# Patient Record
Sex: FEMALE | Race: WHITE | NOT HISPANIC OR LATINO | Employment: FULL TIME | URBAN - METROPOLITAN AREA
[De-identification: names, ages, dates, MRNs, and addresses within clinical notes are randomized per-mention and may not be internally consistent; named-entity substitution may affect disease eponyms.]

---

## 2020-07-19 ENCOUNTER — LAB VISIT (OUTPATIENT)
Dept: PRIMARY CARE CLINIC | Facility: CLINIC | Age: 40
End: 2020-07-19

## 2020-07-19 DIAGNOSIS — Z00.6 RESEARCH STUDY PATIENT: Primary | ICD-10-CM

## 2020-07-19 DIAGNOSIS — Z00.6 RESEARCH STUDY PATIENT: ICD-10-CM

## 2020-07-19 DIAGNOSIS — Z01.84 ENCOUNTER FOR ANTIBODY RESPONSE EXAMINATION: ICD-10-CM

## 2020-07-19 LAB — SARS-COV-2 IGG SERPLBLD QL IA.RAPID: NEGATIVE

## 2020-07-19 PROCEDURE — 86769 SARS-COV-2 COVID-19 ANTIBODY: CPT

## 2020-07-19 PROCEDURE — U0003 INFECTIOUS AGENT DETECTION BY NUCLEIC ACID (DNA OR RNA); SEVERE ACUTE RESPIRATORY SYNDROME CORONAVIRUS 2 (SARS-COV-2) (CORONAVIRUS DISEASE [COVID-19]), AMPLIFIED PROBE TECHNIQUE, MAKING USE OF HIGH THROUGHPUT TECHNOLOGIES AS DESCRIBED BY CMS-2020-01-R: HCPCS

## 2020-07-19 NOTE — RESEARCH
Date of Consent: 7/19/2020    Sponsor: [facility]    Study Title/IRB Number: Observational study of Sars-CoV2 Immunoglobulin G (IgG) seroprevalence among the [facility] population over time 2020.163  Principle Investigator: Heather Vargas, PhD    Did the patient need translation services? No   name: N/A    Prior to the Informed Consent (IC) being signed, or any study protocol required data collection, testing, procedure, or intervention being performed, the following was done and/or discussed:   Patient was given a paper copy of the IC for review    Patient was given study FAQ   Purpose of the study and qualifications to participate    Study design and tests or procedures done at this visit   Confidentiality and HIPAA Authorization for Release of Medical Records for the research trial/ subject's rights/research related injury   Risk, Benefits, Alternative Treatments, Compensation and Costs   Participation in the research trial is voluntary and patient may withdraw at anytime   Contact information for study related questions    Patient verbalizes understanding of the above: Yes  Contact information for PI and IRB given to patient: Yes  Patient able to adequately summarize: the purpose of the study, the risks associated with the study, and all procedures, testing, and follow-ups associated with the study: Yes    The consent was discussed verbally with the patient and all questions were answered satisfactorily. Patient gave verbal consent for the Seroprevalence research study with an IRB approval date of 06/23/2020.      The Consent, Consent Witness and name of Clinical Research Coordinator consenting was captured and documented in REDCap.    All Inclusion and Exclusion Criteria reviewed, subject meets all Inclusion criteria and does not meet any Exclusion Criteria at this time.     Patient Eligibility was confirmed.    Patient responded to survey questions.    The following biospecimen  collection procedures were collected:    -Nasopharyngeal Swab Collection  -Blood collection

## 2020-07-21 LAB — SARS-COV-2 RNA RESP QL NAA+PROBE: NOT DETECTED

## 2024-06-09 ENCOUNTER — HOSPITAL ENCOUNTER (INPATIENT)
Facility: HOSPITAL | Age: 44
LOS: 4 days | Discharge: HOME | DRG: 885 | End: 2024-06-13
Attending: PSYCHIATRY & NEUROLOGY | Admitting: STUDENT IN AN ORGANIZED HEALTH CARE EDUCATION/TRAINING PROGRAM
Payer: COMMERCIAL

## 2024-06-09 ENCOUNTER — HOSPITAL ENCOUNTER (EMERGENCY)
Facility: HOSPITAL | Age: 44
Discharge: PSYCHIATRIC OR SUBSTANCE USE FACILITY | End: 2024-06-09
Attending: EMERGENCY MEDICINE
Payer: COMMERCIAL

## 2024-06-09 VITALS
WEIGHT: 280 LBS | DIASTOLIC BLOOD PRESSURE: 68 MMHG | RESPIRATION RATE: 18 BRPM | OXYGEN SATURATION: 97 % | TEMPERATURE: 96 F | BODY MASS INDEX: 51.21 KG/M2 | HEART RATE: 68 BPM | SYSTOLIC BLOOD PRESSURE: 135 MMHG

## 2024-06-09 DIAGNOSIS — F29 PSYCHOSIS, UNSPECIFIED PSYCHOSIS TYPE: Primary | ICD-10-CM

## 2024-06-09 DIAGNOSIS — Z00.8 MEDICAL CLEARANCE FOR PSYCHIATRIC ADMISSION: ICD-10-CM

## 2024-06-09 DIAGNOSIS — F12.10 MARIJUANA ABUSE: ICD-10-CM

## 2024-06-09 DIAGNOSIS — F23 PSYCHOTIC EPISODE: Primary | ICD-10-CM

## 2024-06-09 DIAGNOSIS — F29 UNSPECIFIED PSYCHOSIS NOT DUE TO A SUBSTANCE OR KNOWN PHYSIOLOGICAL CONDITION: ICD-10-CM

## 2024-06-09 LAB
ALBUMIN SERPL BCP-MCNC: 4.4 G/DL (ref 3.5–5.2)
ALP SERPL-CCNC: 95 U/L (ref 55–135)
ALT SERPL W/O P-5'-P-CCNC: 26 U/L (ref 10–44)
AMPHET+METHAMPHET UR QL: NEGATIVE
ANION GAP SERPL CALC-SCNC: 13 MMOL/L (ref 8–16)
APAP SERPL-MCNC: <3 UG/ML (ref 10–20)
AST SERPL-CCNC: 30 U/L (ref 10–40)
B-HCG UR QL: NEGATIVE
BARBITURATES UR QL SCN>200 NG/ML: NEGATIVE
BASOPHILS # BLD AUTO: 0.06 K/UL (ref 0–0.2)
BASOPHILS NFR BLD: 0.6 % (ref 0–1.9)
BENZODIAZ UR QL SCN>200 NG/ML: NEGATIVE
BILIRUB SERPL-MCNC: 0.5 MG/DL (ref 0.1–1)
BILIRUB UR QL STRIP: NEGATIVE
BUN SERPL-MCNC: 7 MG/DL (ref 6–20)
BZE UR QL SCN: NEGATIVE
CALCIUM SERPL-MCNC: 9.8 MG/DL (ref 8.7–10.5)
CANNABINOIDS UR QL SCN: ABNORMAL
CHLORIDE SERPL-SCNC: 105 MMOL/L (ref 95–110)
CLARITY UR: CLEAR
CO2 SERPL-SCNC: 22 MMOL/L (ref 23–29)
COLOR UR: COLORLESS
CREAT SERPL-MCNC: 0.8 MG/DL (ref 0.5–1.4)
CREAT UR-MCNC: 13.6 MG/DL (ref 15–325)
DIFFERENTIAL METHOD BLD: ABNORMAL
EOSINOPHIL # BLD AUTO: 0.1 K/UL (ref 0–0.5)
EOSINOPHIL NFR BLD: 1 % (ref 0–8)
ERYTHROCYTE [DISTWIDTH] IN BLOOD BY AUTOMATED COUNT: 11.9 % (ref 11.5–14.5)
EST. GFR  (NO RACE VARIABLE): >60 ML/MIN/1.73 M^2
ETHANOL SERPL-MCNC: <10 MG/DL
GLUCOSE SERPL-MCNC: 106 MG/DL (ref 70–110)
GLUCOSE UR QL STRIP: NEGATIVE
HCT VFR BLD AUTO: 42.4 % (ref 37–48.5)
HCV AB SERPL QL IA: NEGATIVE
HEP C VIRUS HOLD SPECIMEN: NORMAL
HGB BLD-MCNC: 14.4 G/DL (ref 12–16)
HGB UR QL STRIP: NEGATIVE
HIV 1+2 AB+HIV1 P24 AG SERPL QL IA: NEGATIVE
IMM GRANULOCYTES # BLD AUTO: 0.06 K/UL (ref 0–0.04)
IMM GRANULOCYTES NFR BLD AUTO: 0.6 % (ref 0–0.5)
KETONES UR QL STRIP: NEGATIVE
LEUKOCYTE ESTERASE UR QL STRIP: NEGATIVE
LYMPHOCYTES # BLD AUTO: 3 K/UL (ref 1–4.8)
LYMPHOCYTES NFR BLD: 29.9 % (ref 18–48)
MCH RBC QN AUTO: 30.6 PG (ref 27–31)
MCHC RBC AUTO-ENTMCNC: 34 G/DL (ref 32–36)
MCV RBC AUTO: 90 FL (ref 82–98)
METHADONE UR QL SCN>300 NG/ML: NEGATIVE
MONOCYTES # BLD AUTO: 0.8 K/UL (ref 0.3–1)
MONOCYTES NFR BLD: 7.5 % (ref 4–15)
NEUTROPHILS # BLD AUTO: 6.2 K/UL (ref 1.8–7.7)
NEUTROPHILS NFR BLD: 60.4 % (ref 38–73)
NITRITE UR QL STRIP: NEGATIVE
NRBC BLD-RTO: 0 /100 WBC
OPIATES UR QL SCN: NEGATIVE
PCP UR QL SCN>25 NG/ML: NEGATIVE
PH UR STRIP: 8 [PH] (ref 5–8)
PLATELET # BLD AUTO: 339 K/UL (ref 150–450)
PMV BLD AUTO: 10.1 FL (ref 9.2–12.9)
POTASSIUM SERPL-SCNC: 3.9 MMOL/L (ref 3.5–5.1)
PROT SERPL-MCNC: 8 G/DL (ref 6–8.4)
PROT UR QL STRIP: NEGATIVE
RBC # BLD AUTO: 4.7 M/UL (ref 4–5.4)
SARS-COV-2 RDRP RESP QL NAA+PROBE: NEGATIVE
SODIUM SERPL-SCNC: 140 MMOL/L (ref 136–145)
SP GR UR STRIP: <1.005 (ref 1–1.03)
TOXICOLOGY INFORMATION: ABNORMAL
TSH SERPL DL<=0.005 MIU/L-ACNC: 1.56 UIU/ML (ref 0.4–4)
URN SPEC COLLECT METH UR: ABNORMAL
UROBILINOGEN UR STRIP-ACNC: NEGATIVE EU/DL
WBC # BLD AUTO: 10.17 K/UL (ref 3.9–12.7)

## 2024-06-09 PROCEDURE — 11400000 HC PSYCH PRIVATE ROOM

## 2024-06-09 PROCEDURE — 81025 URINE PREGNANCY TEST: CPT | Performed by: EMERGENCY MEDICINE

## 2024-06-09 PROCEDURE — 82077 ASSAY SPEC XCP UR&BREATH IA: CPT | Performed by: EMERGENCY MEDICINE

## 2024-06-09 PROCEDURE — 80053 COMPREHEN METABOLIC PANEL: CPT | Performed by: EMERGENCY MEDICINE

## 2024-06-09 PROCEDURE — G0425 INPT/ED TELECONSULT30: HCPCS | Mod: GT,,, | Performed by: PSYCHIATRY & NEUROLOGY

## 2024-06-09 PROCEDURE — U0002 COVID-19 LAB TEST NON-CDC: HCPCS | Performed by: EMERGENCY MEDICINE

## 2024-06-09 PROCEDURE — 25000003 PHARM REV CODE 250: Performed by: EMERGENCY MEDICINE

## 2024-06-09 PROCEDURE — 84443 ASSAY THYROID STIM HORMONE: CPT | Performed by: EMERGENCY MEDICINE

## 2024-06-09 PROCEDURE — 81003 URINALYSIS AUTO W/O SCOPE: CPT | Mod: 59 | Performed by: EMERGENCY MEDICINE

## 2024-06-09 PROCEDURE — 80307 DRUG TEST PRSMV CHEM ANLYZR: CPT | Performed by: EMERGENCY MEDICINE

## 2024-06-09 PROCEDURE — 85025 COMPLETE CBC W/AUTO DIFF WBC: CPT | Performed by: EMERGENCY MEDICINE

## 2024-06-09 PROCEDURE — 99285 EMERGENCY DEPT VISIT HI MDM: CPT

## 2024-06-09 PROCEDURE — 87389 HIV-1 AG W/HIV-1&-2 AB AG IA: CPT | Performed by: EMERGENCY MEDICINE

## 2024-06-09 PROCEDURE — 86803 HEPATITIS C AB TEST: CPT | Performed by: EMERGENCY MEDICINE

## 2024-06-09 PROCEDURE — 80143 DRUG ASSAY ACETAMINOPHEN: CPT | Performed by: EMERGENCY MEDICINE

## 2024-06-09 RX ORDER — LORAZEPAM 1 MG/1
1 TABLET ORAL
Status: COMPLETED | OUTPATIENT
Start: 2024-06-09 | End: 2024-06-09

## 2024-06-09 RX ORDER — HYDROXYZINE PAMOATE 25 MG/1
25 CAPSULE ORAL EVERY 6 HOURS PRN
Status: DISCONTINUED | OUTPATIENT
Start: 2024-06-10 | End: 2024-06-09

## 2024-06-09 RX ORDER — RISPERIDONE 1 MG/1
2 TABLET ORAL
Status: COMPLETED | OUTPATIENT
Start: 2024-06-09 | End: 2024-06-09

## 2024-06-09 RX ORDER — TALC
6 POWDER (GRAM) TOPICAL NIGHTLY PRN
Status: DISCONTINUED | OUTPATIENT
Start: 2024-06-10 | End: 2024-06-09

## 2024-06-09 RX ORDER — HYDROXYZINE PAMOATE 50 MG/1
50 CAPSULE ORAL EVERY 6 HOURS PRN
Status: DISCONTINUED | OUTPATIENT
Start: 2024-06-10 | End: 2024-06-10

## 2024-06-09 RX ORDER — TALC
6 POWDER (GRAM) TOPICAL NIGHTLY PRN
Status: DISCONTINUED | OUTPATIENT
Start: 2024-06-10 | End: 2024-06-10

## 2024-06-09 RX ADMIN — HYDROXYZINE PAMOATE 50 MG: 50 CAPSULE ORAL at 11:06

## 2024-06-09 RX ADMIN — Medication 6 MG: at 11:06

## 2024-06-09 RX ADMIN — LORAZEPAM 1 MG: 1 TABLET ORAL at 02:06

## 2024-06-09 RX ADMIN — RISPERIDONE 2 MG: 1 TABLET, FILM COATED ORAL at 02:06

## 2024-06-09 NOTE — ED NOTES
Pt belongings Grey sweat pants, grey shirt , white t shirt, dark grey t shirt. 2 pair multi color leggins, blue  pull over, inside a blue nike duffel bag. Locker #28. //kah

## 2024-06-09 NOTE — ED NOTES
Pt belongings searched and placed in locked cabinet 28:  Light purple shirt  Purple bra  Black panties  Blue pajama pants  Blue sandals  Silver ring

## 2024-06-09 NOTE — CONSULTS
"[facility]  Psychiatry  Telepsychiatry Consult Note    Please see previous notes:    Patient agreeable to consultation via telepsychiatry.    Tele-Consultation from Psychiatry started: 6/9/2024 at 1256 pm  The chief complaint leading to psychiatric consultation is: jameson  This consultation was requested by Dr. Yonathan Cornell, the Emergency Department attending physician.  The location of the consulting psychiatrist is  [facility] .  The patient location is  [facility] EMERGENCY DEPARTMENT   The patient arrived at the ED at: 8 am    Also present with the patient at the time of the consultation: nurse    Patient Identification:   Jennifer Caceres is a 44 y.o. female.    Patient information was obtained from patient and past medical records.  Patient presented involuntarily on transportation hold to the Emergency Department      Altered Mental Status        Per EMS, pt expressing manic behavior. Received 1.25mg Versed en route, she is calm and cooperative at this time.  reports patient has been taking Amoxicillin x8 days for strep throat and believes pt may be having a reaction to the abx.  reports similar episode x3 years ago.           Review of patient's allergies indicates:   Allergen Reactions    Tirzepatide (weight loss) Anxiety, Hives, Itching and Swelling          History of Present Illness      HPI     6/9/2024, 10:51 AM  History obtained from the patient and EMS                  History of Present Illness: Jennifer Caceres is a 44 y.o. female patient with a PMHx of anxiety disorder who presents to the Emergency Department for evaluation of AMS which onset PTA. Pt believes she is in [place] and doesn't know why she is here. Pt states she has been awake in an "in and out" state for 5 days. Pt states she is having auditory hallucinations but has always had them. Pt denies any SI/HI. Per EMS,  reports pt has taken Amoxicillin 8 days for strep throat and believes this is a " reaction of the medication because she had a similar episode 3 years ago. Pt is in calm and cooperative manner. Per EMS, prior Tx includes 1.25 mg Versed en route.     Arrival mode: AASI       Inpatient consult to Telemedicine - Psyc  Consult performed by: Jenna Nieto MD  Consult ordered by: Yonathan Cornell Jr., MD  Reason for consult: jameson        Teleconsult Time Documentation  Subjective:     History of Present Illness:  The patient received 1.5 mg of Versed on route to the ER this morning.  She has interviewed in the emergency room.  She is acutely manic.  She is experiencing some flight of ideas and repetition.  Affect is labile with irritability to tearfulness.  She is experiencing herself as pressing her own thoughts into other people's minds and hearing the thoughts of everybody else in the hospital positive auditory hallucinations as well.  She is having suicidal thoughts and she feels like everyone wants me to die and join them.  She states that she does not have any intention to try and harm herself while she has in the hospital.  She is agreeable to getting some medication to try and help her feel better.  She does not want me to talk to her  who is waiting in the waiting room.  She can not really explain why.  I could not really get a cogent history from her beyond this because of her mental status but this has happened previously proximally 3 years ago apparently.    Psychiatric History:   Reportedly 1 prior manic episode 3 years ago unknown if she is still taking medications    Substance Abuse History:  Unable to obtain    Legal History: Past charges/incarcerations:  Unable to obtain     Family Psychiatric History:  Unable to obtain      Social History:    Unable to obtain other than that she is  she came in with her     Psychiatric Mental Status Exam:  Arousal: alert  Sensorium/Orientation: oriented to person, situation  Behavior/Cooperation: reluctant to participate,  psychomotor agitation, restless and fidgety , eye contact minimal   Speech: loud, pressured, spontaneous, rapid  Language: grossly intact  Mood: States that she feels like everyone wants her to be dead   Affect:  Labile and inappropriate distressed  Thought Process: flight of ideas  Thought Content:   Auditory hallucinations: YES:       Visual hallucinations: NO  Paranoia: YES:       Delusions:  YES:       Suicidal ideation: YES:       Homicidal ideation: NO  Attention/Concentration:  Impaired  Memory:  Deferred  Fund of Knowledge:  Deferred  Abstract reasoning:  Deferred  Insight: poor awareness of illness  Judgment: limited      Past Medical History:   Past Medical History:   Diagnosis Date    Anxiety disorder, unspecified     History of abnormal cervical Pap smear       Laboratory Data:   Labs Reviewed   CBC W/ AUTO DIFFERENTIAL - Abnormal; Notable for the following components:       Result Value    Immature Granulocytes 0.6 (*)     Immature Grans (Abs) 0.06 (*)     All other components within normal limits   COMPREHENSIVE METABOLIC PANEL - Abnormal; Notable for the following components:    CO2 22 (*)     All other components within normal limits   ACETAMINOPHEN LEVEL - Abnormal; Notable for the following components:    Acetaminophen (Tylenol), Serum <3.0 (*)     All other components within normal limits   ALCOHOL,MEDICAL (ETHANOL)   SARS-COV-2 RNA AMPLIFICATION, QUAL   HIV 1 / 2 ANTIBODY    Narrative:     Release to patient->Immediate   HEPATITIS C ANTIBODY    Narrative:     Release to patient->Immediate   HEP C VIRUS HOLD SPECIMEN    Narrative:     Release to patient->Immediate   TSH   URINALYSIS, REFLEX TO URINE CULTURE   DRUG SCREEN PANEL, URINE EMERGENCY   PREGNANCY TEST, URINE RAPID      Allergies:    Review of patient's allergies indicates:   Allergen Reactions    Tirzepatide (weight loss) Anxiety, Hives, Itching and Swelling       Medications in ER: Medications - No data to display    Medications at home:   Unknown    No new subjective & objective note has been filed under this hospital service since the last note was generated.      Assessment - Diagnosis - Goals:     Diagnosis/Impression:  Bipolar 1 with psychotic features manic severe with psychosis currently.  Gravely disabled requiring inpatient psychiatric hospitalization for stabilization and treatment.    Rec:   Risperidone 2 mg and Ativan 1 mg now  Pec     Time with patient: 125 min      More than 50% of the time was spent counseling/coordinating care    Consulting clinician was informed of the encounter and consult note.    Consultation ended: 6/9/2024 at 118 pm    Jenna Nieto MD   Psychiatry  [facility]

## 2024-06-09 NOTE — ED PROVIDER NOTES
"SCRIBE #1 NOTE: I, Richie Rico, am scribing for, and in the presence of, Yonathan Cornell Jr., MD. I have scribed the entire note.       History     Chief Complaint   Patient presents with    Altered Mental Status     Per EMS, pt expressing manic behavior. Received 1.25mg Versed en route, she is calm and cooperative at this time.  reports patient has been taking Amoxicillin x8 days for strep throat and believes pt may be having a reaction to the abx.  reports similar episode x3 years ago.     Review of patient's allergies indicates:   Allergen Reactions    Tirzepatide (weight loss) Anxiety, Hives, Itching and Swelling         History of Present Illness     HPI    6/9/2024, 10:51 AM  History obtained from the patient and EMS      History of Present Illness: Jennifer Caceres is a 44 y.o. female patient with a PMHx of anxiety disorder who presents to the Emergency Department for evaluation of AMS which onset PTA. Pt believes she is in [place] and doesn't know why she is here. Pt states she has been awake in an "in and out" state for 5 days. Pt states she is having auditory hallucinations but has always had them. Pt denies any SI/HI. Per EMS,  reports pt has taken Amoxicillin 8 days for strep throat and believes this is a reaction of the medication because she had a similar episode 3 years ago. Pt is in calm and cooperative manner. Per EMS, prior Tx includes 1.25 mg Versed en route.    Arrival mode: AASI    PCP: No, Primary Doctor        Past Medical History:  Past Medical History:   Diagnosis Date    Anxiety disorder, unspecified     History of abnormal cervical Pap smear        Past Surgical History:  Past Surgical History:   Procedure Laterality Date    AUGMENTATION OF BREAST  2006    COLPOSCOPY           Family History:  Family History   Problem Relation Name Age of Onset    Emphysema Paternal Grandmother  65    Myocarditis Paternal Grandfather         Social History:  Social History "     Tobacco Use    Smoking status: Every Day     Current packs/day: 0.50     Types: Cigarettes    Smokeless tobacco: Never   Substance and Sexual Activity    Alcohol use: Yes     Comment: OCC    Drug use: Yes     Types: Marijuana     Comment: Medical Card    Sexual activity: Yes     Partners: Male        Review of Systems     Review of Systems   Constitutional:  Negative for fever.   HENT:  Negative for sore throat.    Respiratory:  Negative for shortness of breath.    Cardiovascular:  Negative for chest pain.   Gastrointestinal:  Negative for nausea.   Genitourinary:  Negative for dysuria.   Musculoskeletal:  Negative for back pain.   Skin:  Negative for rash.   Neurological:  Negative for weakness.   Hematological:  Does not bruise/bleed easily.   Psychiatric/Behavioral:  Positive for hallucinations (Auditory). Negative for suicidal ideas.         (-) HI   All other systems reviewed and are negative.       Physical Exam     Initial Vitals [06/09/24 0935]   BP Pulse Resp Temp SpO2   (!) 148/86 81 18 97.6 °F (36.4 °C) 95 %      MAP       --          Physical Exam  Nursing Notes and Vital Signs Reviewed.  Constitutional: Patient is in no acute distress. Well-developed and well-nourished.  Head: Atraumatic. Normocephalic.  Eyes:  EOM intact.  No scleral icterus.  ENT: Mucous membranes are moist.  Nares clear   Neck:  Full ROM. No JVD.  Cardiovascular: Regular rate. Regular rhythm No murmurs, rubs, or gallops. Distal pulses are 2+ and symmetric  Pulmonary/Chest: No respiratory distress. Clear to auscultation bilaterally. No wheezing or rales.  Equal chest wall rise bilaterally  Abdominal: Soft and non-distended.  There is no tenderness.  No rebound, guarding, or rigidity. Good bowel sounds.  Genitourinary: No CVA tenderness.  No suprapubic tenderness  Musculoskeletal: Moves all extremities. No obvious deformities.  5 x 5 strength in all extremities   Skin: Warm and dry.  Neurological:  Alert, awake, and appropriate.   Normal speech.  No acute focal neurological deficits are appreciated.  Two through 12 intact bilaterally.  Psychiatric:  patient was labile and pressured.  She notes continuous auditory hallucinations.  She states she speaks to got all day long.  Patient was not slept in 5 days.  She was jittery and agitated though calm.     ED Course   Procedures  ED Vital Signs:  Vitals:    06/09/24 0935 06/09/24 1146 06/09/24 1358   BP: (!) 148/86 (!) 142/82    Pulse: 81 72    Resp: 18 17    Temp: 97.6 °F (36.4 °C) 97.4 °F (36.3 °C)    TempSrc: Oral Oral    SpO2: 95% 97%    Weight:   127 kg (280 lb)       Abnormal Lab Results:  Labs Reviewed   CBC W/ AUTO DIFFERENTIAL - Abnormal; Notable for the following components:       Result Value    Immature Granulocytes 0.6 (*)     Immature Grans (Abs) 0.06 (*)     All other components within normal limits   COMPREHENSIVE METABOLIC PANEL - Abnormal; Notable for the following components:    CO2 22 (*)     All other components within normal limits   URINALYSIS, REFLEX TO URINE CULTURE - Abnormal; Notable for the following components:    Color, UA Colorless (*)     Specific Gravity, UA <1.005 (*)     All other components within normal limits    Narrative:     Specimen Source->Urine   DRUG SCREEN PANEL, URINE EMERGENCY - Abnormal; Notable for the following components:    THC Presumptive Positive (*)     Creatinine, Urine 13.6 (*)     All other components within normal limits    Narrative:     Specimen Source->Urine   ACETAMINOPHEN LEVEL - Abnormal; Notable for the following components:    Acetaminophen (Tylenol), Serum <3.0 (*)     All other components within normal limits   TSH   ALCOHOL,MEDICAL (ETHANOL)   SARS-COV-2 RNA AMPLIFICATION, QUAL   PREGNANCY TEST, URINE RAPID    Narrative:     Specimen Source->Urine   HIV 1 / 2 ANTIBODY    Narrative:     Release to patient->Immediate   HEPATITIS C ANTIBODY    Narrative:     Release to patient->Immediate   HEP C VIRUS HOLD SPECIMEN    Narrative:      Release to patient->Immediate        All Lab Results:  Results for orders placed or performed during the hospital encounter of 06/09/24   CBC auto differential   Result Value Ref Range    WBC 10.17 3.90 - 12.70 K/uL    RBC 4.70 4.00 - 5.40 M/uL    Hemoglobin 14.4 12.0 - 16.0 g/dL    Hematocrit 42.4 37.0 - 48.5 %    MCV 90 82 - 98 fL    MCH 30.6 27.0 - 31.0 pg    MCHC 34.0 32.0 - 36.0 g/dL    RDW 11.9 11.5 - 14.5 %    Platelets 339 150 - 450 K/uL    MPV 10.1 9.2 - 12.9 fL    Immature Granulocytes 0.6 (H) 0.0 - 0.5 %    Gran # (ANC) 6.2 1.8 - 7.7 K/uL    Immature Grans (Abs) 0.06 (H) 0.00 - 0.04 K/uL    Lymph # 3.0 1.0 - 4.8 K/uL    Mono # 0.8 0.3 - 1.0 K/uL    Eos # 0.1 0.0 - 0.5 K/uL    Baso # 0.06 0.00 - 0.20 K/uL    nRBC 0 0 /100 WBC    Gran % 60.4 38.0 - 73.0 %    Lymph % 29.9 18.0 - 48.0 %    Mono % 7.5 4.0 - 15.0 %    Eosinophil % 1.0 0.0 - 8.0 %    Basophil % 0.6 0.0 - 1.9 %    Differential Method Automated    Comprehensive metabolic panel   Result Value Ref Range    Sodium 140 136 - 145 mmol/L    Potassium 3.9 3.5 - 5.1 mmol/L    Chloride 105 95 - 110 mmol/L    CO2 22 (L) 23 - 29 mmol/L    Glucose 106 70 - 110 mg/dL    BUN 7 6 - 20 mg/dL    Creatinine 0.8 0.5 - 1.4 mg/dL    Calcium 9.8 8.7 - 10.5 mg/dL    Total Protein 8.0 6.0 - 8.4 g/dL    Albumin 4.4 3.5 - 5.2 g/dL    Total Bilirubin 0.5 0.1 - 1.0 mg/dL    Alkaline Phosphatase 95 55 - 135 U/L    AST 30 10 - 40 U/L    ALT 26 10 - 44 U/L    eGFR >60 >60 mL/min/1.73 m^2    Anion Gap 13 8 - 16 mmol/L   TSH   Result Value Ref Range    TSH 1.558 0.400 - 4.000 uIU/mL   Urinalysis, Reflex to Urine Culture Urine, Catheterized    Specimen: Urine   Result Value Ref Range    Specimen UA Urine, Catheterized     Color, UA Colorless (A) Yellow, Straw, Siobhan    Appearance, UA Clear Clear    pH, UA 8.0 5.0 - 8.0    Specific Gravity, UA <1.005 (A) 1.005 - 1.030    Protein, UA Negative Negative    Glucose, UA Negative Negative    Ketones, UA Negative Negative    Bilirubin  (UA) Negative Negative    Occult Blood UA Negative Negative    Nitrite, UA Negative Negative    Urobilinogen, UA Negative <2.0 EU/dL    Leukocytes, UA Negative Negative   Drug screen panel, emergency   Result Value Ref Range    Benzodiazepines Negative Negative    Methadone metabolites Negative Negative    Cocaine (Metab.) Negative Negative    Opiate Scrn, Ur Negative Negative    Barbiturate Screen, Ur Negative Negative    Amphetamine Screen, Ur Negative Negative    THC Presumptive Positive (A) Negative    Phencyclidine Negative Negative    Creatinine, Urine 13.6 (L) 15.0 - 325.0 mg/dL    Toxicology Information SEE COMMENT    Ethanol   Result Value Ref Range    Alcohol, Serum <10 <10 mg/dL   Acetaminophen level   Result Value Ref Range    Acetaminophen (Tylenol), Serum <3.0 (L) 10.0 - 20.0 ug/mL   COVID-19 Rapid Screening   Result Value Ref Range    SARS-CoV-2 RNA, Amplification, Qual Negative Negative   Pregnancy, urine rapid   Result Value Ref Range    Preg Test, Ur Negative    HIV 1/2 Ag/Ab (4th Gen)   Result Value Ref Range    HIV 1/2 Ag/Ab Negative Negative   Hepatitis C Antibody   Result Value Ref Range    Hepatitis C Ab Negative Negative   HCV Virus Hold Specimen   Result Value Ref Range    HEP C Virus Hold Specimen Hold for HCV sendout         Imaging Results:  Imaging Results    None                     The Emergency Provider reviewed the vital signs and test results, which are outlined above.     ED Discussion       10:42 AM: The PEC hold has been issued by Dr. Cornell at this time for psychiatric evaluations.    1:56 PM: Pt has been medically cleared by Dr. Cornell at this time. Reassessed pt at this time. Pt is resting comfortably and appears in no acute distress. There are no psychiatric services offered at this facility. D/w pt all pertinent ED information and plan to transfer to psychiatric facility for psychiatric treatment. Pt verbalizes understanding. [facility] for ongoing  personal protection en route. Pt has been made aware of all risks and benefits associated with transfer, including but not limited to death, MVC, loss of vital signs, and/or permanent disability. Benefits include ability to be treated at an inpatient psychiatric facility. Pt will be transported by personnel trained in CPR and CPI. Patient understands that there could be unforeseen motor vehicle accidents, inclement weather, or loss of vital signs that could result in potential death or permanent disability. All questions and complaints have been addressed at this time. Pt condition is stable at this time and is clear to transfer to psychiatric facility at this time.        1:58 PM: Re-evaluated pt. Informed pt and family that there are no psychiatric services available at this time. I have discussed test results, shared treatment plan, and the need for transfer with patient and family at bedside. All historical, clinical, radiographic, and laboratory findings were reviewed with the patient/family in detail. Patient will be transferred by [facility] with  care required en route. Patient understands that there could be unforeseen motor vehicle accidents or loss of vital signs that could result in potential death or permanent disability. Pt and family express understanding at this time and agree with all information. All questions answered. Pt and family have no further questions or concerns at this time. Pt is ready for transfer.       Medical Decision Making    Patient was evaluated history physical examination.  Pec issued due to jameson.  Patient was medically cleared tele psychiatry recommended continuation of the pec.        Differential diagnosis: Psychosis, jameson, substance abuse, mood disorder, medical clearance for psychiatric placement    Amount and/or Complexity of Data Reviewed  Labs: ordered. Decision-making details documented in ED Course.     Details:  UA clear UDS positive for THC.  COVID is  negative TSH HIV and hepatitis or negative acetaminophen less than 3 and alcohol less than 10.  CMP and CBC were benign.  Discussion of management or test interpretation with external provider(s):  Consultation obtained with tele psychiatry.  They recommended pec along with Risperdal and Ativan    Risk  OTC drugs.  Prescription drug management.  Decision regarding hospitalization.  Diagnosis or treatment significantly limited by social determinants of health.                ED Medication(s):  Medications   risperiDONE tablet 2 mg (2 mg Oral Given 6/9/24 1409)   LORazepam tablet 1 mg (1 mg Oral Given 6/9/24 1409)       New Prescriptions    No medications on file               Scribe Attestation:   Scribe #1: I performed the above scribed service and the documentation accurately describes the services I performed. I attest to the accuracy of the note.     Attending:   Physician Attestation Statement for Scribe #1: I, Yonathan Cornell Jr., MD, personally performed the services described in this documentation, as scribed by Richie Rico, in my presence, and it is both accurate and complete.           Clinical Impression       ICD-10-CM ICD-9-CM   1. Psychosis, unspecified psychosis type  F29 298.9   2. Marijuana abuse  F12.10 305.20   3. Medical clearance for psychiatric admission  Z00.8 V70.8       Disposition:   Disposition: Transferred  Condition: Stable        Yonathan Cornell Jr., MD  06/09/24 2233

## 2024-06-10 PROBLEM — F19.10 SUBSTANCE ABUSE: Status: ACTIVE | Noted: 2024-06-10

## 2024-06-10 PROCEDURE — 25000003 PHARM REV CODE 250: Performed by: STUDENT IN AN ORGANIZED HEALTH CARE EDUCATION/TRAINING PROGRAM

## 2024-06-10 PROCEDURE — 25000003 PHARM REV CODE 250: Performed by: PSYCHIATRY & NEUROLOGY

## 2024-06-10 PROCEDURE — 99223 1ST HOSP IP/OBS HIGH 75: CPT | Mod: ,,, | Performed by: STUDENT IN AN ORGANIZED HEALTH CARE EDUCATION/TRAINING PROGRAM

## 2024-06-10 PROCEDURE — 11400000 HC PSYCH PRIVATE ROOM

## 2024-06-10 RX ORDER — ACETAMINOPHEN 500 MG
1000 TABLET ORAL EVERY 6 HOURS PRN
Status: DISCONTINUED | OUTPATIENT
Start: 2024-06-10 | End: 2024-06-13 | Stop reason: HOSPADM

## 2024-06-10 RX ORDER — ARIPIPRAZOLE 10 MG/1
10 TABLET ORAL DAILY
Status: DISCONTINUED | OUTPATIENT
Start: 2024-06-10 | End: 2024-06-11

## 2024-06-10 RX ORDER — IBUPROFEN 400 MG/1
800 TABLET ORAL EVERY 6 HOURS PRN
Status: DISCONTINUED | OUTPATIENT
Start: 2024-06-10 | End: 2024-06-13 | Stop reason: HOSPADM

## 2024-06-10 RX ORDER — HYDROXYZINE PAMOATE 50 MG/1
50 CAPSULE ORAL EVERY 6 HOURS PRN
Status: DISCONTINUED | OUTPATIENT
Start: 2024-06-10 | End: 2024-06-13 | Stop reason: HOSPADM

## 2024-06-10 RX ORDER — LORAZEPAM 1 MG/1
2 TABLET ORAL EVERY 4 HOURS PRN
Status: DISCONTINUED | OUTPATIENT
Start: 2024-06-10 | End: 2024-06-13 | Stop reason: HOSPADM

## 2024-06-10 RX ORDER — GABAPENTIN 100 MG/1
100 CAPSULE ORAL 3 TIMES DAILY
Status: DISCONTINUED | OUTPATIENT
Start: 2024-06-10 | End: 2024-06-12

## 2024-06-10 RX ORDER — DIPHENHYDRAMINE HYDROCHLORIDE 50 MG/ML
50 INJECTION INTRAMUSCULAR; INTRAVENOUS EVERY 4 HOURS PRN
Status: DISCONTINUED | OUTPATIENT
Start: 2024-06-10 | End: 2024-06-13 | Stop reason: HOSPADM

## 2024-06-10 RX ORDER — DIPHENHYDRAMINE HCL 50 MG
50 CAPSULE ORAL EVERY 4 HOURS PRN
Status: DISCONTINUED | OUTPATIENT
Start: 2024-06-10 | End: 2024-06-13 | Stop reason: HOSPADM

## 2024-06-10 RX ORDER — HALOPERIDOL 5 MG/1
5 TABLET ORAL EVERY 4 HOURS PRN
Status: DISCONTINUED | OUTPATIENT
Start: 2024-06-10 | End: 2024-06-13 | Stop reason: HOSPADM

## 2024-06-10 RX ORDER — IBUPROFEN 200 MG
1 TABLET ORAL DAILY PRN
Status: DISCONTINUED | OUTPATIENT
Start: 2024-06-10 | End: 2024-06-13 | Stop reason: HOSPADM

## 2024-06-10 RX ORDER — TALC
15 POWDER (GRAM) TOPICAL NIGHTLY
Status: DISCONTINUED | OUTPATIENT
Start: 2024-06-10 | End: 2024-06-13 | Stop reason: HOSPADM

## 2024-06-10 RX ORDER — ACETAMINOPHEN 325 MG/1
650 TABLET ORAL EVERY 6 HOURS PRN
Status: DISCONTINUED | OUTPATIENT
Start: 2024-06-10 | End: 2024-06-10

## 2024-06-10 RX ORDER — HALOPERIDOL 5 MG/ML
5 INJECTION INTRAMUSCULAR EVERY 4 HOURS PRN
Status: DISCONTINUED | OUTPATIENT
Start: 2024-06-10 | End: 2024-06-13 | Stop reason: HOSPADM

## 2024-06-10 RX ORDER — LORAZEPAM 2 MG/ML
2 INJECTION INTRAMUSCULAR EVERY 4 HOURS PRN
Status: DISCONTINUED | OUTPATIENT
Start: 2024-06-10 | End: 2024-06-13 | Stop reason: HOSPADM

## 2024-06-10 RX ADMIN — Medication 15 MG: at 08:06

## 2024-06-10 RX ADMIN — GABAPENTIN 100 MG: 100 CAPSULE ORAL at 11:06

## 2024-06-10 RX ADMIN — GABAPENTIN 100 MG: 100 CAPSULE ORAL at 08:06

## 2024-06-10 RX ADMIN — GABAPENTIN 100 MG: 100 CAPSULE ORAL at 02:06

## 2024-06-10 RX ADMIN — ACETAMINOPHEN 1000 MG: 500 TABLET ORAL at 11:06

## 2024-06-10 RX ADMIN — ARIPIPRAZOLE 10 MG: 10 TABLET ORAL at 11:06

## 2024-06-10 NOTE — SUBJECTIVE & OBJECTIVE
Past Medical History:   Diagnosis Date    Anxiety disorder, unspecified     History of abnormal cervical Pap smear        Past Surgical History:   Procedure Laterality Date    AUGMENTATION OF BREAST  2006    COLPOSCOPY         Review of patient's allergies indicates:   Allergen Reactions    Tirzepatide (weight loss) Anxiety, Hives, Itching and Swelling       Current Facility-Administered Medications on File Prior to Encounter   Medication    [COMPLETED] LORazepam tablet 1 mg    [COMPLETED] risperiDONE tablet 2 mg     Current Outpatient Medications on File Prior to Encounter   Medication Sig    FLUoxetine 40 MG capsule      Family History       Problem Relation (Age of Onset)    Emphysema Paternal Grandmother (65)    Myocarditis Paternal Grandfather          Tobacco Use    Smoking status: Every Day     Current packs/day: 0.50     Types: Cigarettes    Smokeless tobacco: Never   Substance and Sexual Activity    Alcohol use: Yes     Comment: OCC    Drug use: Yes     Types: Marijuana     Comment: Medical Card    Sexual activity: Yes     Partners: Male     Review of Systems   Constitutional:  Negative for fatigue and fever.   HENT:  Negative for congestion, ear pain and sore throat.    Eyes:  Negative for pain and discharge.   Respiratory:  Negative for cough, shortness of breath and wheezing.    Gastrointestinal:  Negative for abdominal pain, constipation, diarrhea, nausea and vomiting.   Endocrine: Negative for cold intolerance and heat intolerance.   Genitourinary:  Negative for difficulty urinating, dysuria and frequency.   Musculoskeletal:  Negative for arthralgias.   Allergic/Immunologic: Negative for environmental allergies.   Neurological:  Negative for dizziness, tremors and seizures.   Psychiatric/Behavioral:  Positive for behavioral problems and hallucinations. The patient is hyperactive.    All other systems reviewed and are negative.    Objective:     Vital Signs (Most Recent):  Temp: 97.8 °F (36.6 °C)  (06/10/24 0747)  Pulse: 99 (06/10/24 0747)  Resp: 19 (06/10/24 0747)  BP: 135/73 (06/10/24 0747)  SpO2: (!) 94 % (06/10/24 0747) Vital Signs (24h Range):  Temp:  [96.4 °F (35.8 °C)-98.1 °F (36.7 °C)] 97.8 °F (36.6 °C)  Pulse:  [63-99] 99  Resp:  [16-19] 19  SpO2:  [94 %-97 %] 94 %  BP: (112-148)/(68-99) 135/73     Weight: 127 kg (279 lb 15.8 oz)  Body mass index is 35.95 kg/m².     Physical Exam  Vitals and nursing note reviewed.   Constitutional:       Appearance: Normal appearance.   HENT:      Head: Normocephalic and atraumatic.      Nose: Nose normal.      Mouth/Throat:      Mouth: Mucous membranes are moist.      Pharynx: Oropharynx is clear.   Eyes:      Extraocular Movements: Extraocular movements intact.      Conjunctiva/sclera: Conjunctivae normal.      Pupils: Pupils are equal, round, and reactive to light.   Cardiovascular:      Rate and Rhythm: Normal rate and regular rhythm.      Pulses: Normal pulses.      Heart sounds: Normal heart sounds.   Pulmonary:      Effort: Pulmonary effort is normal.      Breath sounds: Normal breath sounds.   Abdominal:      General: Abdomen is flat. Bowel sounds are normal.      Palpations: Abdomen is soft.   Musculoskeletal:         General: Normal range of motion.      Cervical back: Normal range of motion and neck supple.   Skin:     General: Skin is warm and dry.      Capillary Refill: Capillary refill takes less than 2 seconds.      Comments: No rashes on limited skin exam.   Neurological:      General: No focal deficit present.      Mental Status: She is alert and oriented to person, place, and time.      Cranial Nerves: No cranial nerve deficit.      Comments: I Olfactory:  Sense of smell intact    II Optic:  Pupils equal round react to light.  Vision intact.    III, IV, VI, Ocular motor, Trochlear, Abducens:  Extraocular movements intact    V Trigeminal:  Facial sensation intact facial sensation intact,, muscles of mastication intact muscles of mastication intact,  corneal reflex intact, corneal reflex intact    VII Facial:  Muscles of facial expression intact     VIII Vestibular cochlear: Hearing intact vestibular cochlear: Hearing intact    IX Glossopharyngeal:  Gag reflex intact.  Tasting intact.     X Vagus:  Gag reflex intact.    XI Spinal Accessory:  Shoulder shrug intact.  Head rotation intact.    XII Hypoglossal:  Tongue movements intact.     Psychiatric:         Attention and Perception: She perceives auditory hallucinations.         Speech: Speech is rapid and pressured.         Thought Content: Thought content does not include homicidal or suicidal ideation.         Judgment: Judgment is inappropriate.              CRANIAL NERVES     CN III, IV, VI   Pupils are equal, round, and reactive to light.       Significant Labs: All pertinent labs within the past 24 hours have been reviewed.    Significant Imaging: I have reviewed all pertinent imaging results/findings within the past 24 hours.

## 2024-06-10 NOTE — PLAN OF CARE
43 yo F transferred from .. via [facility]. Hx of bipolar 1 with psychotic features, manic severe with psychosis currently. Gravely disabled. Was admin Versed in route to ED prt EMS. SI, flight of ideas. Pt  told  Pt arrived to unit escorted by [employer] and security. [employer] performed with neg results. Pt cooperative needs prompting and redirecting. Laughs inappropriately and gives different answers to same question, not reliable historian. Pt wondering into other pt rooms and redirected to her room.

## 2024-06-10 NOTE — H&P
"  [facility] Hospital Medicine  History & Physical    Patient Name: Jennifer Caceres  MRN: [id number]  Patient Class: IP- Psych  Admission Date: 6/9/2024  Attending Physician: Sebas Lester MD   Primary Care Provider: [facility] Primary Doctor         Patient information was obtained from ER records.     Subjective:     Principal Problem:Psychotic episode    Chief Complaint: No chief complaint on file.       HPI:   Altered Mental Status        Per EMS, pt expressing manic behavior. Received 1.25mg Versed en route, she is calm and cooperative at this time.  reports patient has been taking Amoxicillin x8 days for strep throat and believes pt may be having a reaction to the abx.  reports similar episode x3 years ago.                        Jennifer Caceres is a 44 y.o. female patient with a PMHx of anxiety disorder who presents to the Emergency Department for evaluation of AMS which onset PTA. Pt believes she is in [place] and doesn't know why she is here. Pt states she has been awake in an "in and out" state for 5 days. Pt states she is having auditory hallucinations but has always had them. Pt denies any SI/HI. Per EMS,  reports pt has taken Amoxicillin 8 days for strep throat and believes this is a reaction of the medication because she had a similar episode 3 years ago. Pt is in calm and cooperative manner. Per EMS, prior Tx includes 1.25 mg Versed en route.    06/10/24: patient remains symptomatic at this time. Will continue inpatient psych admission for further evaluation and medication management.     Past Medical History:   Diagnosis Date    Anxiety disorder, unspecified     History of abnormal cervical Pap smear        Past Surgical History:   Procedure Laterality Date    AUGMENTATION OF BREAST  2006    COLPOSCOPY         Review of patient's allergies indicates:   Allergen Reactions    Tirzepatide (weight loss) Anxiety, Hives, Itching and Swelling "       Current Facility-Administered Medications on File Prior to Encounter   Medication    [COMPLETED] LORazepam tablet 1 mg    [COMPLETED] risperiDONE tablet 2 mg     Current Outpatient Medications on File Prior to Encounter   Medication Sig    FLUoxetine 40 MG capsule      Family History       Problem Relation (Age of Onset)    Emphysema Paternal Grandmother (65)    Myocarditis Paternal Grandfather          Tobacco Use    Smoking status: Every Day     Current packs/day: 0.50     Types: Cigarettes    Smokeless tobacco: Never   Substance and Sexual Activity    Alcohol use: Yes     Comment: OCC    Drug use: Yes     Types: Marijuana     Comment: Medical Card    Sexual activity: Yes     Partners: Male     Review of Systems   Constitutional:  Negative for fatigue and fever.   HENT:  Negative for congestion, ear pain and sore throat.    Eyes:  Negative for pain and discharge.   Respiratory:  Negative for cough, shortness of breath and wheezing.    Gastrointestinal:  Negative for abdominal pain, constipation, diarrhea, nausea and vomiting.   Endocrine: Negative for cold intolerance and heat intolerance.   Genitourinary:  Negative for difficulty urinating, dysuria and frequency.   Musculoskeletal:  Negative for arthralgias.   Allergic/Immunologic: Negative for environmental allergies.   Neurological:  Negative for dizziness, tremors and seizures.   Psychiatric/Behavioral:  Positive for behavioral problems and hallucinations. The patient is hyperactive.    All other systems reviewed and are negative.    Objective:     Vital Signs (Most Recent):  Temp: 97.8 °F (36.6 °C) (06/10/24 0747)  Pulse: 99 (06/10/24 0747)  Resp: 19 (06/10/24 0747)  BP: 135/73 (06/10/24 0747)  SpO2: (!) 94 % (06/10/24 0747) Vital Signs (24h Range):  Temp:  [96.4 °F (35.8 °C)-98.1 °F (36.7 °C)] 97.8 °F (36.6 °C)  Pulse:  [63-99] 99  Resp:  [16-19] 19  SpO2:  [94 %-97 %] 94 %  BP: (112-148)/(68-99) 135/73     Weight: 127 kg (279 lb 15.8 oz)  Body mass  index is 35.95 kg/m².     Physical Exam  Vitals and nursing note reviewed.   Constitutional:       Appearance: Normal appearance.   HENT:      Head: Normocephalic and atraumatic.      Nose: Nose normal.      Mouth/Throat:      Mouth: Mucous membranes are moist.      Pharynx: Oropharynx is clear.   Eyes:      Extraocular Movements: Extraocular movements intact.      Conjunctiva/sclera: Conjunctivae normal.      Pupils: Pupils are equal, round, and reactive to light.   Cardiovascular:      Rate and Rhythm: Normal rate and regular rhythm.      Pulses: Normal pulses.      Heart sounds: Normal heart sounds.   Pulmonary:      Effort: Pulmonary effort is normal.      Breath sounds: Normal breath sounds.   Abdominal:      General: Abdomen is flat. Bowel sounds are normal.      Palpations: Abdomen is soft.   Musculoskeletal:         General: Normal range of motion.      Cervical back: Normal range of motion and neck supple.   Skin:     General: Skin is warm and dry.      Capillary Refill: Capillary refill takes less than 2 seconds.      Comments: No rashes on limited skin exam.   Neurological:      General: No focal deficit present.      Mental Status: She is alert and oriented to person, place, and time.      Cranial Nerves: No cranial nerve deficit.      Comments: I Olfactory:  Sense of smell intact    II Optic:  Pupils equal round react to light.  Vision intact.    III, IV, VI, Ocular motor, Trochlear, Abducens:  Extraocular movements intact    V Trigeminal:  Facial sensation intact facial sensation intact,, muscles of mastication intact muscles of mastication intact, corneal reflex intact, corneal reflex intact    VII Facial:  Muscles of facial expression intact     VIII Vestibular cochlear: Hearing intact vestibular cochlear: Hearing intact    IX Glossopharyngeal:  Gag reflex intact.  Tasting intact.     X Vagus:  Gag reflex intact.    XI Spinal Accessory:  Shoulder shrug intact.  Head rotation intact.    XII  Hypoglossal:  Tongue movements intact.     Psychiatric:         Attention and Perception: She perceives auditory hallucinations.         Speech: Speech is rapid and pressured.         Thought Content: Thought content does not include homicidal or suicidal ideation.         Judgment: Judgment is inappropriate.              CRANIAL NERVES     CN III, IV, VI   Pupils are equal, round, and reactive to light.       Significant Labs: All pertinent labs within the past 24 hours have been reviewed.    Significant Imaging: I have reviewed all pertinent imaging results/findings within the past 24 hours.  Assessment/Plan:     * Psychotic episode  To be admitted to our inpatient psychiatric unit for further evaluation and management.        Substance abuse  UDS + for THC. Recommend cessation.         VTE Risk Mitigation (From admission, onward)      None                            Adrian Zuñiga Jr, MD  Department of Hospital Medicine  [facility] ([facility])

## 2024-06-10 NOTE — PLAN OF CARE
Problem: Adult Behavioral Health Plan of Care  Goal: Patient-Specific Goal (Individualization)  Outcome: Progressing  Goal: Adheres to Safety Considerations for Self and Others  Outcome: Progressing  Goal: Absence of New-Onset Illness or Injury  Outcome: Progressing  Goal: Optimized Coping Skills in Response to Life Stressors  Outcome: Progressing  Goal: Develops/Participates in Therapeutic [place] to Support Successful Transition  Outcome: Progressing

## 2024-06-10 NOTE — NURSING
Plan of care reviewed.  Pt has been in the common areas most of the morning.  Pt remains anxious, depressed.  Pt very tearful at times.  Pt denies si, hi or a v hallucinations at this time.  Gravely disabled.  Pt interacting with staff and select peers without difficulty.  No behavioral outbursts noted.  Pt had her psych eval done today with new med orders noted.  See emar.  Pt's appetite adequate.  Pt states she slept well last night.  Pt did not attend group today.  Pt instructed to call for any needs or concerns at all.  Verbalized understanding.  Will cont to monitor for safety.  Patient care ongoing.

## 2024-06-10 NOTE — H&P
"PSYCHIATRY INPATIENT ADMISSION NOTE - H & P      6/10/2024 10:47 AM   Jennifer Caceres   1980   [id number]         DATE OF ADMISSION: 6/9/2024  9:45 PM    SITE: [facility]    CURRENT LEGAL STATUS: PEC and/or CEC      HISTORY    CHIEF COMPLAINT   Jennifer Caceres is a 44 y.o. female with a past psychiatric history of  bipolar disorder and anxiety  currently admitted to the inpatient unit with the following chief complaint: thoughts of death/suicide, jameson, psychosis        HPI   The patient was seen and examined. The chart was reviewed.    The patient presented to the ER on 6/9/2024 .    The patient was medically cleared and admitted to the U.      Per ED MD:  Altered Mental Status         Per EMS, pt expressing manic behavior. Received 1.25mg Versed en route, she is calm and cooperative at this time.  reports patient has been taking Amoxicillin x8 days for strep throat and believes pt may be having a reaction to the abx.  reports similar episode x3 years ago.                      Jennifer Caceres is a 44 y.o. female patient with a PMHx of anxiety disorder who presents to the Emergency Department for evaluation of AMS which onset PTA. Pt believes she is in [place] and doesn't know why she is here. Pt states she has been awake in an "in and out" state for 5 days. Pt states she is having auditory hallucinations but has always had them. Pt denies any SI/HI. Per EMS,  reports pt has taken Amoxicillin 8 days for strep throat and believes this is a reaction of the medication because she had a similar episode 3 years ago. Pt is in calm and cooperative manner. Per EMS, prior Tx includes 1.25 mg Versed en route.     06/10/24: patient remains symptomatic at this time. Will continue inpatient psych admission for further evaluation and medication management.       Per RN:  43 yo F transferred from . via \[facility] [place] [facility]". Hx of bipolar 1 with psychotic features, manic severe with psychosis currently. " "Gravely disabled. Was admin Versed in route to ED prt EMS. SI, flight of ideas. Pt  told Pt arrived to unit escorted by [employer] and security. Proscan performed with neg results. Pt cooperative needs prompting and redirecting. Laughs inappropriately and gives different answers to same question, not reliable historian. Pt wondering into other pt rooms and redirected to her room.       Per ED psych consult MD:  The patient received 1.5 mg of Versed on route to the ER this morning. She has interviewed in the emergency room. She is acutely manic. She is experiencing some flight of ideas and repetition. Affect is labile with irritability to tearfulness. She is experiencing herself as pressing her own thoughts into other people's minds and hearing the thoughts of everybody else in the hospital positive auditory hallucinations as well. She is having suicidal thoughts and she feels like everyone wants me to die and join them. She states that she does not have any intention to try and harm herself while she has in the hospital. She is agreeable to getting some medication to try and help her feel better. She does not want me to talk to her  who is waiting in the waiting room. She can not really explain why. I could not really get a cogent history from her beyond this because of her mental status but this has happened previously proximally 3 years ago apparently.       Psychiatric interview:  "I have not been able to sleep for the last 5 days, I have too much to do, I was trying to get things done, I thought, I'll just have fun and gets stuff done, I was reorganizing, been having fun, my  said I can't do this again, so he called my best friend... I had a lot of plans." Reports 1 prior episode, in 2022, "I was in a behavioral hospital, I didn't want to fall asleep, because I thought I wouldn't wake up." Reports she has been smoking MJ nightly, "I love it, I smoked enough indica to pass out, I kept trying to " "do that... my heart rate was getting really fast, I had songs stuff in my head."        Symptoms of Depression: diminished mood - No, loss of interest/anhedonia - No;  recurrent - No,     Changes in Sleep: trouble with initiation- Yes, maintenance, - Yes early morning awakening with inability to return to sleep - No, hypersomnolence - No    Suicidal- active/passive ideations -  denies, see per notes , organized plans, future intentions - No    Homicidal ideations: active/passive ideations - No, organized plans, future intentions - No    Symptoms of psychosis: hallucinations - Yes, delusions - Yes, disorganized speech - No, disorganized behavior or abnormal motor behavior - No, or negative symptoms (diminshed emotional expression, avolition, anhedonia, alogia, asociality) - No,    Symptoms of jameson or hypomania: elevated, expansive, or irritable mood with increased energy or activity - Yes; > 4 days - Yes,  >7 days - No; with inflated self-esteem or grandiosity - Yes, decreased need for sleep - Yes, increased rate of speech - Yes, FOI or racing thoughts - Yes, distractibility - Yes, increased goal directed activity or PMA - Yes, risky/disinhibited behavior - No    Symptoms of KARIN: excessive anxiety/worry/fear, more days than not, about numerous issues - No, ongoing for >6 months - No, difficult to control - No, with restlessness - No, fatigue - No, poor concentration - No, irritability - No, muscle tension - No, sleep disturbance - No; causes functionally impairing distress - No    Symptoms of Panic Disorder: recurrent panic attacks (palpitations/heart racing, sweating, shakiness, dyspnea, choking, chest pain/discomfort, Gi symptoms, dizzy/lightheadedness, hot/col flashes, paresthesias, derealization, fear of losing control or fear of dying or fear of "going crazy") - No, precipitated - No, un-precipitated - No, source of worry and/or behavioral changes secondary for 1 month or longer- No, agoraphobia - " "No    Symptoms of PTSD: h/o trauma exposure - No; re-experiencing/intrusive symptoms - No, avoidant behavior - No, 2 or more negative alterations in cognition or mood - No, 2 or more hyperarousal symptoms - No; with dissociative symptoms - No, ongoing for 1 or more  months - No    Symptoms of Anorexia: restriction of caloric intake leading to significantly low body weight - No, intense fear of gaining weight or persistent behavior that interferes with weight gain even thought at a significantly low weight - No, disturbance in the way in which one's body weight or shape is experienced, undue influence of body weight or shape on self evaluation, or persistent lack of recognition of the seriousness of the current low body weight - No    Symptoms of Bulimia: recurrent episodes of binge eating (definitely larger amount  than what others would eat and lack of a sense of control over eating during episode) - No, recurrent inappropriate compensatory behaviors in order to prevent weight gain (fasting, medications, exercise, vomiting) - No, binges and compensatory behaviors both occur on average at least once a week for 3 months - No, self evaluations is unduly influenced by body shape/weight- - No        PAST PSYCHIATRIC HISTORY  Previous Psychiatric Hospitalizations: Yes  Previous SI/HI: Yes,  Previous Suicide Attempts: No,   Previous Medication Trials: Yes, "I don't remember.. I was on wellbutrin and prozac."   Psychiatric Care (current & past): Yes,  History of Psychotherapy: Yes,  History of Violence: No,  History of sexual/physical abuse: No,      PAST MEDICAL & SURGICAL HISTORY   Past Medical History:   Diagnosis Date    Anxiety disorder, unspecified     History of abnormal cervical Pap smear      Past Surgical History:   Procedure Laterality Date    AUGMENTATION OF BREAST  2006    COLPOSCOPY           CURRENT PSYCH MEDICATION REGIMEN   Prozac 60 mg PO qd        Home Meds:   Prior to Admission medications    Medication " "Sig Start Date End Date Taking? Authorizing Provider   FLUoxetine 40 MG capsule  3/6/23  Yes Provider, Historical         Scheduled Meds:    PRN Meds:   Current Facility-Administered Medications:     acetaminophen, 650 mg, Oral, Q6H PRN    haloperidoL, 5 mg, Oral, Q4H PRN **AND** diphenhydrAMINE, 50 mg, Oral, Q4H PRN **AND** LORazepam, 2 mg, Oral, Q4H PRN **AND** haloperidol lactate, 5 mg, Intramuscular, Q4H PRN **AND** diphenhydrAMINE, 50 mg, Intramuscular, Q4H PRN **AND** lorazepam, 2 mg, Intramuscular, Q4H PRN    hydrOXYzine pamoate, 50 mg, Oral, Q6H PRN    melatonin, 6 mg, Oral, Nightly PRN    nicotine, 1 patch, Transdermal, Daily PRN   Psychotherapeutics (From admission, onward)      Start     Stop Route Frequency Ordered    06/10/24 0738  haloperidoL tablet 5 mg  (Med - Acute  Behavioral Management)        Placed in "And" Linked Group    -- Oral Every 4 hours PRN 06/10/24 0738    06/10/24 0738  LORazepam tablet 2 mg  (Med - Acute  Behavioral Management)        Placed in "And" Linked Group    -- Oral Every 4 hours PRN 06/10/24 0738    06/10/24 0738  haloperidol lactate injection 5 mg  (Med - Acute  Behavioral Management)        Placed in "And" Linked Group    -- IM Every 4 hours PRN 06/10/24 0738    06/10/24 0738  LORazepam injection 2 mg  (Med - Acute  Behavioral Management)        Placed in "And" Linked Group    -- IM Every 4 hours PRN 06/10/24 0738            ALLERGIES   Review of patient's allergies indicates:   Allergen Reactions    Tirzepatide (weight loss) Anxiety, Hives, Itching and Swelling       NEUROLOGIC HISTORY  Seizures: No  Head trauma: No    SOCIAL HISTORY:  Developmental/Childhood:Achieved all developmental milestones timely  Education: Master's degree  Employment Status/Finances:Employed   Relationship Status/Sexual Orientation:   Children: 0  Housing Status: Home    history:  NO   Access to Firearms: NO ;  Locked up? n/a  Congregational:Actively participates in organized " [employer]  Recreational activities: sports    SUBSTANCE ABUSE HISTORY   Recreational Drugs: marijuana   Use of Alcohol: occasional, social use  Rehab History:no   Tobacco Use:yes    LEGAL HISTORY:   Past charges/incarcerations: NO  Pending charges:NO    FAMILY PSYCHIATRIC HISTORY   Family History   Problem Relation Name Age of Onset    Emphysema Paternal Grandmother  65    Myocarditis Paternal Grandfather       Denies       ROS  Review of Systems   Constitutional:  Negative for chills and fever.   HENT:  Negative for hearing loss.    Eyes:  Negative for blurred vision and double vision.   Respiratory:  Negative for shortness of breath.    Cardiovascular:  Negative for chest pain and palpitations.   Gastrointestinal:  Negative for constipation, diarrhea, nausea and vomiting.   Genitourinary:  Negative for dysuria.   Musculoskeletal:  Negative for back pain and neck pain.   Skin:  Negative for rash.   Neurological:  Negative for dizziness and headaches.   Endo/Heme/Allergies:  Negative for environmental allergies.           EXAMINATION    PHYSICAL EXAM  Reviewed note/exam by Dr. Cornell, Yonathan SOMMER Jr., MD from 06/09/24 1417    VITALS   Vitals:    06/10/24 0747   BP: 135/73   Pulse: 99   Resp: 19   Temp: 97.8 °F (36.6 °C)        Body mass index is 35.95 kg/m².        PAIN  0/10  Subjective report of pain matches objective signs and symptoms: Yes    LABORATORY DATA   Recent Results (from the past 72 hour(s))   CBC auto differential    Collection Time: 06/09/24 11:12 AM   Result Value Ref Range    WBC 10.17 3.90 - 12.70 K/uL    RBC 4.70 4.00 - 5.40 M/uL    Hemoglobin 14.4 12.0 - 16.0 g/dL    Hematocrit 42.4 37.0 - 48.5 %    MCV 90 82 - 98 fL    MCH 30.6 27.0 - 31.0 pg    MCHC 34.0 32.0 - 36.0 g/dL    RDW 11.9 11.5 - 14.5 %    Platelets 339 150 - 450 K/uL    MPV 10.1 9.2 - 12.9 fL    Immature Granulocytes 0.6 (H) 0.0 - 0.5 %    Gran # (ANC) 6.2 1.8 - 7.7 K/uL    Immature Grans (Abs) 0.06 (H) 0.00 - 0.04 K/uL    Lymph # 3.0  1.0 - 4.8 K/uL    Mono # 0.8 0.3 - 1.0 K/uL    Eos # 0.1 0.0 - 0.5 K/uL    Baso # 0.06 0.00 - 0.20 K/uL    nRBC 0 0 /100 WBC    Gran % 60.4 38.0 - 73.0 %    Lymph % 29.9 18.0 - 48.0 %    Mono % 7.5 4.0 - 15.0 %    Eosinophil % 1.0 0.0 - 8.0 %    Basophil % 0.6 0.0 - 1.9 %    Differential Method Automated    Comprehensive metabolic panel    Collection Time: 06/09/24 11:12 AM   Result Value Ref Range    Sodium 140 136 - 145 mmol/L    Potassium 3.9 3.5 - 5.1 mmol/L    Chloride 105 95 - 110 mmol/L    CO2 22 (L) 23 - 29 mmol/L    Glucose 106 70 - 110 mg/dL    BUN 7 6 - 20 mg/dL    Creatinine 0.8 0.5 - 1.4 mg/dL    Calcium 9.8 8.7 - 10.5 mg/dL    Total Protein 8.0 6.0 - 8.4 g/dL    Albumin 4.4 3.5 - 5.2 g/dL    Total Bilirubin 0.5 0.1 - 1.0 mg/dL    Alkaline Phosphatase 95 55 - 135 U/L    AST 30 10 - 40 U/L    ALT 26 10 - 44 U/L    eGFR >60 >60 mL/min/1.73 m^2    Anion Gap 13 8 - 16 mmol/L   TSH    Collection Time: 06/09/24 11:12 AM   Result Value Ref Range    TSH 1.558 0.400 - 4.000 uIU/mL   Ethanol    Collection Time: 06/09/24 11:12 AM   Result Value Ref Range    Alcohol, Serum <10 <10 mg/dL   Acetaminophen level    Collection Time: 06/09/24 11:12 AM   Result Value Ref Range    Acetaminophen (Tylenol), Serum <3.0 (L) 10.0 - 20.0 ug/mL   HIV 1/2 Ag/Ab (4th Gen)    Collection Time: 06/09/24 11:12 AM   Result Value Ref Range    HIV 1/2 Ag/Ab Negative Negative   Hepatitis C Antibody    Collection Time: 06/09/24 11:12 AM   Result Value Ref Range    Hepatitis C Ab Negative Negative   HCV Virus Hold Specimen    Collection Time: 06/09/24 11:12 AM   Result Value Ref Range    HEP C Virus Hold Specimen Hold for HCV sendout    COVID-19 Rapid Screening    Collection Time: 06/09/24 11:20 AM   Result Value Ref Range    SARS-CoV-2 RNA, Amplification, Qual Negative Negative   Urinalysis, Reflex to Urine Culture Urine, Catheterized    Collection Time: 06/09/24  1:03 PM    Specimen: Urine   Result Value Ref Range    Specimen UA Urine,  "Catheterized     Color, UA Colorless (A) Yellow, Straw, Siobhan    Appearance, UA Clear Clear    pH, UA 8.0 5.0 - 8.0    Specific Gravity, UA <1.005 (A) 1.005 - 1.030    Protein, UA Negative Negative    Glucose, UA Negative Negative    Ketones, UA Negative Negative    Bilirubin (UA) Negative Negative    Occult Blood UA Negative Negative    Nitrite, UA Negative Negative    Urobilinogen, UA Negative <2.0 EU/dL    Leukocytes, UA Negative Negative   Drug screen panel, emergency    Collection Time: 06/09/24  1:03 PM   Result Value Ref Range    Benzodiazepines Negative Negative    Methadone metabolites Negative Negative    Cocaine (Metab.) Negative Negative    Opiate Scrn, Ur Negative Negative    Barbiturate Screen, Ur Negative Negative    Amphetamine Screen, Ur Negative Negative    THC Presumptive Positive (A) Negative    Phencyclidine Negative Negative    Creatinine, Urine 13.6 (L) 15.0 - 325.0 mg/dL    Toxicology Information SEE COMMENT    Pregnancy, urine rapid    Collection Time: 06/09/24  1:03 PM   Result Value Ref Range    Preg Test, Ur Negative       No results found for: "PHENYTOIN", "PHENOBARB", "VALPROATE", "CBMZ"        CONSTITUTIONAL  General Appearance: unremarkable, age appropriate    MUSCULOSKELETAL  Muscle Strength and Tone:no tremor, no tic  Abnormal Involuntary Movements: No  Gait and Station: non-ataxic    PSYCHIATRIC   Level of Consciousness: awake and alert   Orientation: person, place, and situation  Grooming: Casually dressed and Well groomed  Psychomotor Behavior: normal, cooperative  Speech: normal tone, normal rate, normal pitch, normal volume  Language: grossly intact  Mood: anxious  Affect: Consistent with mood, labile, crying  Thought Process: circumstantial  Associations: intact   Thought Content: +delusions, less SI, and denies HI  Perceptions: +AH and denies  VH  Memory: Able to recall past events, Remote intact, and Recent intact  Attention:Attends to interview without distraction  Fund of " Knowledge: Aware of current events and Vocabulary appropriate   Estimate if Intelligence:  Average based on work/education history, vocabulary and mental status exam  Insight: has awareness of illness  Judgment: behavior is adequate to circumstances      PSYCHOSOCIAL    PSYCHOSOCIAL STRESSORS   family    FUNCTIONING RELATIONSHIPS   good support system    STRENGTHS AND LIABILITIES   Strength: Patient accepts guidance/feedback, Strength: Patient is expressive/articulate., Liability: Patient is impulsive., Liability: Patient lacks coping skills.    Is the patient aware of the biomedical complications associated with substance abuse and mental illness? yes    Does the patient have an Advance Directive for Mental Health treatment? no  (If yes, inform patient to bring copy.)        MEDICAL DECISION MAKING        ASSESSMENT       Bipolar disorder, type I, MRE manic, severe with PF  Unspecified anxiety disorder  Cannabis abuse  Obesity        PROBLEM LIST AND MANAGEMENT PLANS    Bipolar disorder, type I, MRE manic, severe with PF  - stop prozac  - start abilify 10 mg PO qd  - pt counseled  - follow up with outpatient mental health providers after discharge for medication management and psychotherapy    Unspecified anxiety disorder  - start hydroxyzine 50 mg PO q 6 hours PRN  - start melatonin 15 mg PO qhs for sleep  - pt counseled  - follow up with outpatient mental health providers after discharge for medication management and psychotherapy    Cannabis abuse  - start gabapentin 100 mg PO TID  - pt counseled  - follow up with outpatient mental health providers after discharge for medication management and psychotherapy    Obesity  - avoid medications with high potential for serious weight gain (remeron, seroquel, etc.)        PRESCRIPTION DRUG MANAGEMENT  Compliance: yes  Side Effects: unknown  Regimen Adjustments: see above    Discussed diagnosis, risks and benefits of proposed treatment vs alternative treatments vs no  treatment, potential side effects of these treatments and the inherent unpredictability of treatment. The patient expresses understanding of the above and displays the capacity to agree with this treatment given said understanding. Patient also agrees that, currently, the benefits outweigh the risks and would like to pursue/continue treatment at this time.    Any medications being used off-label were discussed with the patient inclusive of the evidence base for the use of the medications and consent was obtained for the off-label use of the medication.         DIAGNOSTIC TESTING  Labs reviewed with patient; follow up pending labs    Disposition:  -Will attempt to obtain outside psychiatric records if available  -SW to assist with aftercare planning and collateral  -Once stable discharge home with outpatient follow up care and/or rehab  -Continue inpatient treatment under a PEC and/or CEC for danger to self/ danger to others/grave disability as evident by danger to self and gravely disabled        Leonel Busch III, MD  Psychiatry

## 2024-06-10 NOTE — HPI
"Altered Mental Status        Per EMS, pt expressing manic behavior. Received 1.25mg Versed en route, she is calm and cooperative at this time.  reports patient has been taking Amoxicillin x8 days for strep throat and believes pt may be having a reaction to the abx.  reports similar episode x3 years ago.                        Jennifer Caceres is a 44 y.o. female patient with a PMHx of anxiety disorder who presents to the Emergency Department for evaluation of AMS which onset PTA. Pt believes she is in [place] and doesn't know why she is here. Pt states she has been awake in an "in and out" state for 5 days. Pt states she is having auditory hallucinations but has always had them. Pt denies any SI/HI. Per EMS,  reports pt has taken Amoxicillin 8 days for strep throat and believes this is a reaction of the medication because she had a similar episode 3 years ago. Pt is in calm and cooperative manner. Per EMS, prior Tx includes 1.25 mg Versed en route.    06/10/24: patient remains symptomatic at this time. Will continue inpatient psych admission for further evaluation and medication management.   "

## 2024-06-11 PROCEDURE — 90833 PSYTX W PT W E/M 30 MIN: CPT | Mod: ,,, | Performed by: PSYCHIATRY & NEUROLOGY

## 2024-06-11 PROCEDURE — 99232 SBSQ HOSP IP/OBS MODERATE 35: CPT | Mod: ,,, | Performed by: PSYCHIATRY & NEUROLOGY

## 2024-06-11 PROCEDURE — 25000003 PHARM REV CODE 250: Performed by: STUDENT IN AN ORGANIZED HEALTH CARE EDUCATION/TRAINING PROGRAM

## 2024-06-11 PROCEDURE — 11400000 HC PSYCH PRIVATE ROOM

## 2024-06-11 RX ADMIN — GABAPENTIN 100 MG: 100 CAPSULE ORAL at 08:06

## 2024-06-11 RX ADMIN — Medication 15 MG: at 08:06

## 2024-06-11 RX ADMIN — ARIPIPRAZOLE 10 MG: 10 TABLET ORAL at 08:06

## 2024-06-11 RX ADMIN — GABAPENTIN 100 MG: 100 CAPSULE ORAL at 03:06

## 2024-06-11 RX ADMIN — ACETAMINOPHEN 1000 MG: 500 TABLET ORAL at 08:06

## 2024-06-11 NOTE — PLAN OF CARE
Collateral:     Arron Viera, spouse [phone]       Collateral Perception of Problem:     - About 2 weeks ago, she had strep throat.   - She has been taking Amoxicillin for 8 days.   - I noticed she wasn't sleeping last Friday.   - She kept working on projects nonstop.   - Sunday morning, she was still not sleeping and started acting crazy.   - 3 years ago, she was taking Mounjaro and became manic, but this time she wasn't take any weight loss medication.  - I took her to the [facility] in [place], and she was acting childish at the hospital.       Previous Psych History/Hospitalizations:    [facility] [place]      Suicide Attempts (how/severity):    No      How long has pt had problems (childhood dx?):    She takes Prozac for anxiety and depression.       Impulse issues:    No       History of violence:     No      Drug Use:    Medical Marijuana occasionally      Alcohol Use:    Socially 1-2 a week       Legal Issues:    No       Other Pertinent Info:     - She has a big work meeting tomorrow which may have caused some stress.  - She has struggled with her weight since childhood.        Baseline:    Energetic, very positive, always thinking about others, helpful      Discharge Plan:    Home with spouse

## 2024-06-11 NOTE — PLAN OF CARE
Plan of care ongoing. Patient awake and alert. Cooperative with staff. Pleasant mood. Compliant with medication. Participates in group sessions. Interacts appropriately with peers on unit. Out for meals and snack times, good appetite. Denies any SI/HI/AVH. Reports symptoms of depression and anxiety are less than yesterday. No behavioral issues at this time. Will continue to monitor for patient safety.     Problem: Adult Behavioral Health Plan of Care  Goal: Patient-Specific Goal (Individualization)  Outcome: Progressing  Goal: Adheres to Safety Considerations for Self and Others  Outcome: Progressing  Goal: Absence of New-Onset Illness or Injury  Outcome: Progressing  Goal: Optimized Coping Skills in Response to Life Stressors  Outcome: Progressing  Goal: Develops/Participates in Therapeutic [place] to Support Successful Transition  Outcome: Progressing     Problem: Violence Risk or Actual  Goal: Anger and Impulse Control  Outcome: Progressing     Problem: Depressive Signs/Symptoms  Goal: Increased Participation and Engagement (Depressive Signs/Symptoms)  Outcome: Progressing  Goal: Enhanced Social, Occupational or Functional Skills (Depressive Signs/Symptoms)  Outcome: Progressing     Problem: Anxiety Signs/Symptoms  Goal: Optimized Cognitive Function (Anxiety Signs/Symptoms)  Outcome: Progressing  Goal: Improved Somatic Symptoms (Anxiety Signs/Symptoms)  Outcome: Progressing     Problem: Psychotic Signs/Symptoms  Goal: Improved Mood Symptoms (Psychotic Signs/Symptoms)  Outcome: Progressing     Problem: Suicidal Behavior  Goal: Suicidal Behavior is Absent or Managed  Outcome: Progressing     Problem: Excessive Substance Use  Goal: Improved Physiologic Symptoms (Excessive Substance Use)  Outcome: Progressing     Problem: Fall Injury Risk  Goal: Absence of Fall and Fall-Related Injury  Outcome: Progressing

## 2024-06-11 NOTE — PLAN OF CARE
Pt. Is awake, alert and oriented x 4. She denied any c/o auditory or visual hallucinations at this time. Pt. Also denied any c/o suicidal or homicidal ideations. She stated that she remains anxious at times, but not at this particular time. Pt. Took her night medications without diff., ate her snack and went to bed. She is now sleeping with resp. Even and unlabored. Will cont. To monitor Pt.  Problem: Adult Behavioral Health Plan of Care  Goal: Patient-Specific Goal (Individualization)  Outcome: Progressing  Goal: Adheres to Safety Considerations for Self and Others  Outcome: Progressing  Goal: Absence of New-Onset Illness or Injury  Outcome: Progressing  Goal: Optimized Coping Skills in Response to Life Stressors  Outcome: Progressing  Goal: Develops/Participates in Therapeutic [place] to Support Successful Transition  Outcome: Progressing

## 2024-06-11 NOTE — PROGRESS NOTES
"PSYCHIATRY DAILY INPATIENT PROGRESS NOTE  SUBSEQUENT HOSPITAL VISIT    ENCOUNTER DATE: 6/11/2024  SITE: [facility]    DATE OF ADMISSION: 6/9/2024  9:45 PM  LENGTH OF STAY: 2 days      CHIEF COMPLAINT   Jennifer Caceres is a 44 y.o. female, seen during daily osorio rounds on the inpatient unit.  Jennifer Caceres presented with the chief complaint of thoughts of death/suicide, jameson, psychosis       The patient was seen and examined. The chart was reviewed.     Reviewed notes from Rns and MD and labs from the last 24 hours.    The patient's case was discussed with the treatment team/care providers today including Rns and MD    Staff reports no behavioral or management issues.     The patient has been compliant with treatment.      Subjective 06/11/2024     Patient reports mood is "pretty good", affect is grossly appropriate, pleasant. Pt continues to demonstrate symptoms consistent with jameson/hypomania including rapid speech, distractibility, and mild-moderate flight of ideas, however this seems to be improving somewhat based on yesterday's noted.  Pt reports that prior to this hospitalization she hadn't slept in "4 or 5 days" and "I was just having a good time, cleaning the house, getting stuff done, I didn't really realize there was something wrong." She does report 1 previous hospitalization about 3 years ago for similar symptoms.     Patient reports sleeping well last night, "better than I have in days."       The patient denies any side effects to medications.        Interim/overnight events per report/notes:          Psychiatric ROS (observed, reported, or endorsed/denied):  Depressed mood - less  Interest/pleasure/anhedonia: less  Guilt/hopelessness/worthlessness - less  Changes in Sleep - less  Changes in Appetite - No  Changes in Concentration - Yes  Changes in Energy - No  PMA/R- No  Suicidal- active/passive ideations - No  Homicidal ideations: active/passive ideations - No    Hallucinations - " No  Delusions - fluctuating  Disorganized behavior - No  Disorganized speech - No  Negative symptoms - No    Elevated mood - Yes  Decreased need for sleep - improving minimally  Grandiosity - No  Racing thoughts - Yes  Impulsivity - No  Irritability- No  Increased energy - Yes  Distractibility - Continuing  Increase in goal-directed activity or PMA- improving minimally    Symptoms of KARIN - Yes  Symptoms of Panic Disorder- No  Symptoms of PTSD - No        Overall progress: Patient is showing mild improvement         Psychotherapy:  Target symptoms: mood swings, mood disorder  Why chosen therapy is appropriate versus another modality: relevant to diagnosis, evidence based practice  Outcome monitoring methods: self-report, observation  Therapeutic intervention type: insight oriented psychotherapy, supportive psychotherapy  Topics discussed/themes: building skills sets for symptom management, symptom recognition  The patient's response to the intervention is accepting. The patient's progress toward treatment goals is fair.   Duration of intervention: 16 minutes.        Medical ROS  Review of Systems   Constitutional: Negative.    HENT: Negative.     Eyes: Negative.    Respiratory: Negative.     Cardiovascular: Negative.    Gastrointestinal: Negative.    Genitourinary: Negative.    Musculoskeletal: Negative.    Skin: Negative.    Neurological: Negative.    Endo/Heme/Allergies: Negative.    Psychiatric/Behavioral:  The patient is nervous/anxious.          PAST MEDICAL HISTORY   Past Medical History:   Diagnosis Date    Anxiety disorder, unspecified     History of abnormal cervical Pap smear            PSYCHOTROPIC MEDICATIONS   Scheduled Meds:   ARIPiprazole  10 mg Oral Daily    gabapentin  100 mg Oral TID    melatonin  15 mg Oral Nightly     Continuous Infusions:  PRN Meds:.  Current Facility-Administered Medications:     acetaminophen, 1,000 mg, Oral, Q6H PRN    haloperidoL, 5 mg, Oral, Q4H PRN **AND** diphenhydrAMINE,  "50 mg, Oral, Q4H PRN **AND** LORazepam, 2 mg, Oral, Q4H PRN **AND** haloperidol lactate, 5 mg, Intramuscular, Q4H PRN **AND** diphenhydrAMINE, 50 mg, Intramuscular, Q4H PRN **AND** lorazepam, 2 mg, Intramuscular, Q4H PRN    hydrOXYzine pamoate, 50 mg, Oral, Q6H PRN    ibuprofen, 800 mg, Oral, Q6H PRN    nicotine, 1 patch, Transdermal, Daily PRN        EXAMINATION    VITALS   Vitals:    06/09/24 2201 06/10/24 0747 06/10/24 1922 06/11/24 0731   BP: (!) 142/99 135/73 118/63 129/72   BP Location: Left arm Left arm Left arm Right arm   Patient Position: Sitting Lying Sitting Sitting   Pulse: 68 99 82 79   Resp: 16 19 16 20   Temp: 98.1 °F (36.7 °C) 97.8 °F (36.6 °C) 98.1 °F (36.7 °C) 98 °F (36.7 °C)   TempSrc: Oral Oral Oral Oral   SpO2: 97% (!) 94% 95% 96%   Weight: 127 kg (279 lb 15.8 oz)      Height: 6' 2" (1.88 m)          Body mass index is 35.95 kg/m².        CONSTITUTIONAL  General Appearance: unremarkable, age appropriate    MUSCULOSKELETAL  Muscle Strength and Tone:no tremor, no tic  Abnormal Involuntary Movements: No  Gait and Station: non-ataxic    PSYCHIATRIC   Level of Consciousness: awake, alert , and oriented  Orientation: person, place, time, and situation  Grooming: Casually dressed and Disheveled  Psychomotor Behavior: normal, cooperative, friendly and cooperative, restless and fidgety   Speech: normal tone, pressured, rapid  Language: grossly intact  Mood: "Good"  Affect: Consistent with mood, Congruent with thought, and somewhat elevated  Thought Process: circumstantial  Associations: intact   Thought Content: less delusions, denies SI, and denies HI  Perceptions: denies AH, denies  VH, no TH, and not RIS  Memory: Able to recall past events, Remote intact, and Recent intact  Attention:Impaired but improving  Fund of Knowledge: Aware of current events and Vocabulary appropriate   Estimate if Intelligence:  Above average based on work/education history, vocabulary and mental status exam  Insight: has " awareness of illness  Judgment: behavior is adequate to circumstances        DIAGNOSTIC TESTING   Laboratory Results  No results found for this or any previous visit (from the past 24 hour(s)).          MEDICAL DECISION MAKING      ASSESSMENT:   Bipolar disorder, type I, MRE manic, severe with PF  Unspecified anxiety disorder  Cannabis abuse  Obesity           PROBLEM LIST AND MANAGEMENT PLANS     Bipolar disorder, type I, MRE manic, severe with PF  - stop prozac  - start abilify 10 mg PO qd-Increase to 15 mg tomorrow  - pt counseled  - follow up with outpatient mental health providers after discharge for medication management and psychotherapy     Unspecified anxiety disorder  - start hydroxyzine 50 mg PO q 6 hours PRN  - start melatonin 15 mg PO qhs for sleep  - pt counseled  - follow up with outpatient mental health providers after discharge for medication management and psychotherapy     Cannabis abuse  - start gabapentin 100 mg PO TID  - pt counseled  - follow up with outpatient mental health providers after discharge for medication management and psychotherapy     Obesity  - avoid medications with high potential for serious weight gain (remeron, seroquel, etc.)          Discussed diagnosis, risks and benefits of proposed treatment vs alternative treatments vs no treatment, potential side effects of these treatments and the inherent unpredictability of treatment. The patient expresses understanding of the above and displays the capacity to agree with this treatment given said understanding. Patient also agrees that, currently, the benefits outweigh the risks and would like to pursue/continue treatment at this time.    Any medications being used off-label were discussed with the patient inclusive of the evidence base for the use of the medications and consent was obtained for the off-label use of the medication.       DISCHARGE PLANNING  Expected Disposition Plan: Home or Self Care      NEED FOR CONTINUED  HOSPITALIZATION  Psychiatric illness continues to pose a potential threat to life or bodily function, of self or others, thereby requiring the need for continued inpatient psychiatric hospitalization: Yes, due to: gravely disabled, as evidenced by:  ongoing s/s of hypomania/jameson    Protective inpatient pyschiatric hospitalization required while a safe disposition plan is enacted: Yes    Patient stabilized and ready for discharge from inpatient psychiatric unit: No        STAFF:   Kirit Esquivel NP  Psychiatry

## 2024-06-11 NOTE — PLAN OF CARE
"Behavioral Health Unit  Psychosocial History and Assessment  Progress Note      Patient Name: Jennifer Caceres YOB: 1980 SW: CIARA Giraldo  Date: 6/11/2024    Chief Complaint: thoughts of death/suicide, jameson, psychosis     Consent:     Did the patient consent for an interview with the ? Yes    Did the patient consent for the  to contact family/friend/caregiver? Arron Caceres, spouse [phone]        Did the patient give consent for the  to inform family/friend/caregiver of his/her whereabouts or to discuss discharge planning? Yes    Source of Information: Face to face with patient, Telephone interview with family/friend/caregiver, and Chart review    Is information obtained from interviews considered reliable? yes    Reason for Admission:     Active Hospital Problems    Diagnosis  POA    *Psychotic episode [F23]  Yes    Substance abuse [F19.10]  Unknown      Resolved Hospital Problems   No resolved problems to display.       History of Present Illness - (Patient Perception):     "My  called someone, and they monitored me behind the scenes for a few hours. I talked myself into thinking it was opposite day. I went to [facility], and I thought everything was a game. I hadn't slept in 5 days. I was at [facility] in [place], [place] in 2022. That time I hadn't slept in 20 days."      History of Present Illness - (Perception of Others):     About 2 weeks ago, she had strep throat. She has been taking Amoxicillin for 8 days. I noticed she wasn't sleeping last Friday. She kept working on projects nonstop. Sunday morning, she was still not sleeping and started acting crazy. 3 years ago, she was taking Mounjaro and became manic, but this time she wasn't take any weight loss medication. I took her to the [facility] in [place], and she was acting childish at the hospital according to Arron Caceres, spouse.       Present biopsychosocial functioning:     Per H&P, " ""Jennifer Caceres is a 44 y.o. female patient with a PMHx of anxiety disorder who presents to the Emergency Department for evaluation of AMS which onset PTA. Pt believes she is in [place] and doesn't know why she is here. Pt states she has been awake in an "in and out" state for 5 days. Pt states she is having auditory hallucinations but has always had them. Pt denies any SI/HI." UDS positive for THC. Pt is  and lives at home with spouse. Pt has no children. Pt works full-time as a teacher. Pt is independent with ADLs. Pt has good family support. Pt reports most recent stressor is not getting adequate sleep and displaying jameson symptoms. Pt reported no present mental health services.       Past biopsychosocial functioning:     [facility] in [place], [place] in 2022. Pt reports no prior SA.       Family and Marital/Relationship History:     Significant Other/Partner Relationships:     Family Relationships: Intact      Childhood History:     Where was patient raised? [place]  and grew up mostly in [place], [place]       Who raised the patient? Biological parents      How does patient describe their childhood? Really well-rounded      Who is patient's primary support person? Spouse       Culture and Muslim:     Muslim: Other    How strong of a role does Bahai and spirituality play in patient's life? Huge    Hoahaoism or spiritual concerns regarding treatment: not applicable     History of Abuse:   History of Abuse: Denies      Outcome: n/a    Psychiatric and Medical History:     History of psychiatric illness or treatment: prior inpatient treatment, psychotropic management by PCP, and has participated in counseling/psychotherapy on an outpatient basis in the past    Medical history:   Past Medical History:   Diagnosis Date    Anxiety disorder, unspecified     History of abnormal cervical Pap smear        Substance Abuse History:     Alcohol - (Patient Perspective):   Social History     Substance and " Sexual Activity   Alcohol Use Yes    Comment: Pt reports she is a social drinker.       Alcohol - (Collateral Perspective): Socially 1-2x a week  according to Arron Viera, spouse.     Drugs - (Patient Perspective):   Social History     Substance and Sexual Activity   Drug Use Yes    Types: Marijuana    Comment: Medical Card       Drugs - (Collateral Perspective): Medical Marijuana occasionally according to Arron Viera, spouse.     Additional Comments: n/a    Education:     Currently Enrolled? No  Post-College Graduate Degree    Special Education? No    Interested in Completing Education/GED: No    Employment and Financial:     Currently employed? Employed: Current Occupation: Teacher    Source of Income: salary    Able to afford basic needs (food, shelter, utilities)? Yes    Who manages finances/personal affairs? Self       Service:     ? no    Combat Service? No     Community Resources:     Describe present use of community resources: none reported     Identify previously used community resources   (Include previous mental health treatment - outpatient and inpatient): [facility] in [place], [place] in 2022. Pt reports no prior SA.     Environmental:     Current living situation:Lives with spouse, Lives in home    Social Evaluation:     Patient Assets: General fund of knowledge, Average or above intelligence, Supportive family/friends, Motivation for treatment/growth, Capable of independent living, Work skills, Ability for insight, Communicable skills, and Financial means    Patient Limitations: lacks coping skills    High risk psychosocial issues that may impact discharge planning: none reported    Recommendations:     Anticipated discharge plan: home with outpatient follow up    High risk issues requiring early treatment planning and immediate intervention: thoughts of death/suicide, jameson, psychosis     Community resources needed for discharge planning: aftercare treatment sources    Anticipated  social work role(s) in treatment and discharge planning: SW will facilitate process groups to assist pt develop healthy coping skills; CM will arrange outpatient follow-up appointments and assist with discharge planning.

## 2024-06-11 NOTE — PLAN OF CARE
06/11/24 1507   Step 1: Warning Signs   Warning Sign 1 Judgemental people   Warning Sign 2 n/a   Warning Sign 3 n/a   Step 2: Internal coping strategies - Things I can do to take my mind off my problems without contacting another person:   Coping Strategy 1 Listen to music   Coping Strategy 2 Watch the News   Coping Strategy 3 Al-Anon   Step 3: People and social settings that provide distraction:   1. Name Arron Viera, spouse       Phone [phone]   2. Name FriendЕлена       Phone [phone]   3. Place [place]   4. Place Tailgating    Step 4: People whom I can ask for help:   1. Person Arron Viera, spouse       Phone [phone]   2. Person FriendЕлена       Phone [phone]   3. Person FriendLuzmaria       Phone n/a   Step 5: Professionals or agencies I can contact during a crisis:   1. Clinician Name [facility]       Phone [phone]   Step 6: Making the environment safer (plan for lethal means safety)   Safe Environment Plan Have a plan as soon as Insomnia  is detected.   Safe Environment Optional: What is most important to me and worth living for? Family   Safety Plan Tasks   Provided a Hard Copy to the Patient Y   Explained How to Follow the Steps Y   Discussed Facilitators and Barriers Y

## 2024-06-12 PROCEDURE — 25000003 PHARM REV CODE 250: Performed by: STUDENT IN AN ORGANIZED HEALTH CARE EDUCATION/TRAINING PROGRAM

## 2024-06-12 PROCEDURE — 25000003 PHARM REV CODE 250: Performed by: PSYCHIATRY & NEUROLOGY

## 2024-06-12 PROCEDURE — 11400000 HC PSYCH PRIVATE ROOM

## 2024-06-12 PROCEDURE — 90833 PSYTX W PT W E/M 30 MIN: CPT | Mod: ,,, | Performed by: PSYCHIATRY & NEUROLOGY

## 2024-06-12 PROCEDURE — 99232 SBSQ HOSP IP/OBS MODERATE 35: CPT | Mod: ,,, | Performed by: PSYCHIATRY & NEUROLOGY

## 2024-06-12 RX ORDER — ARIPIPRAZOLE 10 MG/1
20 TABLET ORAL DAILY
Status: DISCONTINUED | OUTPATIENT
Start: 2024-06-13 | End: 2024-06-13 | Stop reason: HOSPADM

## 2024-06-12 RX ORDER — GABAPENTIN 300 MG/1
300 CAPSULE ORAL 3 TIMES DAILY
Status: DISCONTINUED | OUTPATIENT
Start: 2024-06-12 | End: 2024-06-13 | Stop reason: HOSPADM

## 2024-06-12 RX ADMIN — GABAPENTIN 300 MG: 300 CAPSULE ORAL at 02:06

## 2024-06-12 RX ADMIN — ARIPIPRAZOLE 15 MG: 10 TABLET ORAL at 08:06

## 2024-06-12 RX ADMIN — GABAPENTIN 300 MG: 300 CAPSULE ORAL at 08:06

## 2024-06-12 RX ADMIN — Medication 15 MG: at 08:06

## 2024-06-12 RX ADMIN — GABAPENTIN 100 MG: 100 CAPSULE ORAL at 08:06

## 2024-06-12 NOTE — PLAN OF CARE
POC reviewed this shift and is on going. Patient is calm, cooperative, and quiet. Denies Suicidal Ideation, Homicidal Ideation, Auditory Hallucinations, Visual Hallucinations, Tactile Hallucinations, Gustatory Hallucinations, and Delusions. Patient has been out on the floor all day talking with her peers and watching TV. Patient participated in the group session that was conducted today. Patient is a good candidate for discharge tomorrow. Pt continues to be medication compliant and staff will continue to monitor pt closely while on the unit.

## 2024-06-12 NOTE — PROGRESS NOTES
"NP attempted to contact patient's mother Florencia per patient request. No answer, voicemail was left.     Spoke to patient's  Arron. He reports that the patient "sounds much more like her normal self" adding "the other day she was having trouble remembering names, she was talking fast, only got about 4 hours of sleep the last few days." Reports that the last two days, conversations have been "much clearer, she's making a lot more sense" and feels she is at baseline level of functioning.  He is comfortable with the patient retuning home and feels she is ready for discharge.   "

## 2024-06-12 NOTE — NURSING
Pt calm and cooperative on unit. Pt compliant with med pass. Pt interacting with peers at beginning of shift. No ss of distress noted. Will continue to monitor for safety.

## 2024-06-12 NOTE — PLAN OF CARE
Problem: Adult Behavioral Health Plan of Care  Goal: Develops/Participates in Therapeutic [place] to Support Successful Transition  Intervention: Mutually Develop Transition Plan  Flowsheets (Taken 6/12/2024 0438)  Readmission Within the Last 30 Days: no previous admission in last 30 days  Outpatient/Agency/Support Group Needs:   outpatient medication management   outpatient counseling  Anticipated Discharge Disposition: home with family  Transportation Concerns: none  Patient/Family Anticipated Services at Transition: mental health services  Patient/Family Anticipates Transition to: home with family  Transportation Anticipated: family or friend will provide  Concerns to be Addressed:   mental health   medication

## 2024-06-12 NOTE — PROGRESS NOTES
"PSYCHIATRY DAILY INPATIENT PROGRESS NOTE  SUBSEQUENT HOSPITAL VISIT    ENCOUNTER DATE: 6/12/2024  SITE: [facility]    DATE OF ADMISSION: 6/9/2024  9:45 PM  LENGTH OF STAY: 3 days      CHIEF COMPLAINT   Jennifer Caceres is a 44 y.o. female, seen during daily osorio rounds on the inpatient unit.  Jennifer Caceres presented with the chief complaint of thoughts of death/suicide, jameson, psychosis       The patient was seen and examined. The chart was reviewed.     Reviewed notes from Rns and MD and labs from the last 24 hours.    The patient's case was discussed with the treatment team/care providers today including Rns and MD    Staff reports no behavioral or management issues.     The patient has been compliant with treatment.      Subjective 06/12/2024    Patient reports mood is "great," affect is appropriate. She continues to demonstrate increased rate of speech, however content of speech is grossly linear/logical. Today she discusses some psychosocial stressors that may have contributed to most recent episode, particularly her 's alcoholism. She reports speaking to him over the phone and states he is agreeable to resume ALANON.    She endorses some mild anxiety today and is requesting gabapentin dose be increased.     Pt reports sleeping "great" x 2 nights.     Pending collateral, pt may be candidate for discharge within 24-48 hours.             Interim/overnight events per report/notes:          Psychiatric ROS (observed, reported, or endorsed/denied):  Depressed mood - less  Interest/pleasure/anhedonia: less  Guilt/hopelessness/worthlessness - less  Changes in Sleep - less  Changes in Appetite - No  Changes in Concentration - Yes  Changes in Energy - No  PMA/R- No  Suicidal- active/passive ideations - No  Homicidal ideations: active/passive ideations - No    Hallucinations - No  Delusions - fluctuating  Disorganized behavior - No  Disorganized speech - No  Negative symptoms - No    Elevated mood - " less  Decreased need for sleep - denies  Grandiosity - No  Racing thoughts - less  Impulsivity - No  Irritability- No  Increased energy - less  Distractibility - less  Increase in goal-directed activity or PMA- improving minimally    Symptoms of KARIN - Yes  Symptoms of Panic Disorder- No  Symptoms of PTSD - No        Overall progress: Patient is showing mild improvement         Psychotherapy:  Target symptoms: mood swings, mood disorder  Why chosen therapy is appropriate versus another modality: relevant to diagnosis, evidence based practice  Outcome monitoring methods: self-report, observation  Therapeutic intervention type: insight oriented psychotherapy, supportive psychotherapy  Topics discussed/themes: building skills sets for symptom management, symptom recognition  The patient's response to the intervention is accepting. The patient's progress toward treatment goals is fair.   Duration of intervention: 16 minutes.        Medical ROS  Review of Systems   Constitutional: Negative.    HENT: Negative.     Eyes: Negative.    Respiratory: Negative.     Cardiovascular: Negative.    Gastrointestinal: Negative.    Genitourinary: Negative.    Musculoskeletal: Negative.    Skin: Negative.    Neurological: Negative.    Endo/Heme/Allergies: Negative.    Psychiatric/Behavioral:  The patient is nervous/anxious.          PAST MEDICAL HISTORY   Past Medical History:   Diagnosis Date    Anxiety disorder, unspecified     History of abnormal cervical Pap smear            PSYCHOTROPIC MEDICATIONS   Scheduled Meds:   ARIPiprazole  15 mg Oral Daily    gabapentin  100 mg Oral TID    melatonin  15 mg Oral Nightly     Continuous Infusions:  PRN Meds:.  Current Facility-Administered Medications:     acetaminophen, 1,000 mg, Oral, Q6H PRN    haloperidoL, 5 mg, Oral, Q4H PRN **AND** diphenhydrAMINE, 50 mg, Oral, Q4H PRN **AND** LORazepam, 2 mg, Oral, Q4H PRN **AND** haloperidol lactate, 5 mg, Intramuscular, Q4H PRN **AND**  "diphenhydrAMINE, 50 mg, Intramuscular, Q4H PRN **AND** lorazepam, 2 mg, Intramuscular, Q4H PRN    hydrOXYzine pamoate, 50 mg, Oral, Q6H PRN    ibuprofen, 800 mg, Oral, Q6H PRN    nicotine, 1 patch, Transdermal, Daily PRN        EXAMINATION    VITALS   Vitals:    06/10/24 1922 06/11/24 0731 06/11/24 1922 06/12/24 0712   BP: 118/63 129/72 135/81 123/85   BP Location: Left arm Right arm Left arm Left arm   Patient Position: Sitting Sitting Sitting Sitting   Pulse: 82 79 85 80   Resp: 16 20 16 20   Temp: 98.1 °F (36.7 °C) 98 °F (36.7 °C) 97.7 °F (36.5 °C) 97.8 °F (36.6 °C)   TempSrc: Oral Oral Oral Oral   SpO2: 95% 96% 96% (!) 94%   Weight:       Height:           Body mass index is 35.95 kg/m².        CONSTITUTIONAL  General Appearance: unremarkable, age appropriate    MUSCULOSKELETAL  Muscle Strength and Tone:no tremor, no tic  Abnormal Involuntary Movements: No  Gait and Station: non-ataxic    PSYCHIATRIC   Level of Consciousness: awake, alert , and oriented  Orientation: person, place, time, and situation  Grooming: Casually dressed and Disheveled  Psychomotor Behavior: normal, cooperative, friendly and cooperative  Speech: normal tone, normal pitch, normal volume, rate somewhat elevated  Language: grossly intact  Mood: "Great"  Affect: Appropriate, Consistent with mood, and Congruent with thought  Thought Process: linear, logical  Associations: intact   Thought Content: less delusions, denies SI, and denies HI  Perceptions: denies AH, denies  VH, no TH, and not RIS  Memory: Able to recall past events, Remote intact, and Recent intact  Attention:Impaired but improving  Fund of Knowledge: Aware of current events and Vocabulary appropriate   Estimate if Intelligence:  Above average based on work/education history, vocabulary and mental status exam  Insight: has awareness of illness  Judgment: behavior is adequate to circumstances        DIAGNOSTIC TESTING   Laboratory Results  No results found for this or any previous " visit (from the past 24 hour(s)).          MEDICAL DECISION MAKING      ASSESSMENT:   Bipolar disorder, type I, MRE manic, severe with PF  Unspecified anxiety disorder  Cannabis abuse  Obesity           PROBLEM LIST AND MANAGEMENT PLANS     Bipolar disorder, type I, MRE manic, severe with PF  - stop prozac  - start abilify 10 mg PO qd-Increase to 15 mg tomorrow-Continue, increase to 20 mg tomorrow  - pt counseled  - follow up with outpatient mental health providers after discharge for medication management and psychotherapy     Unspecified anxiety disorder  - start hydroxyzine 50 mg PO q 6 hours PRN  - start melatonin 15 mg PO qhs for sleep  - pt counseled  - follow up with outpatient mental health providers after discharge for medication management and psychotherapy     Cannabis abuse  - start gabapentin 100 mg PO TID-Increase to 300 mg TID  - pt counseled  - follow up with outpatient mental health providers after discharge for medication management and psychotherapy     Obesity  - avoid medications with high potential for serious weight gain (remeron, seroquel, etc.)          Discussed diagnosis, risks and benefits of proposed treatment vs alternative treatments vs no treatment, potential side effects of these treatments and the inherent unpredictability of treatment. The patient expresses understanding of the above and displays the capacity to agree with this treatment given said understanding. Patient also agrees that, currently, the benefits outweigh the risks and would like to pursue/continue treatment at this time.    Any medications being used off-label were discussed with the patient inclusive of the evidence base for the use of the medications and consent was obtained for the off-label use of the medication.       DISCHARGE PLANNING  Expected Disposition Plan: Home or Self Care      NEED FOR CONTINUED HOSPITALIZATION  Psychiatric illness continues to pose a potential threat to life or bodily function, of  self or others, thereby requiring the need for continued inpatient psychiatric hospitalization: Yes, due to: gravely disabled, as evidenced by:  ongoing s/s of hypomania/jameson    Protective inpatient pyschiatric hospitalization required while a safe disposition plan is enacted: Yes    Patient stabilized and ready for discharge from inpatient psychiatric unit: No        STAFF:   Kirit Esquivel NP  Psychiatry

## 2024-06-12 NOTE — PLAN OF CARE
Problem: Adult Behavioral Health Plan of Care  Goal: Optimized Coping Skills in Response to Life Stressors  Intervention: Promote Effective Coping Strategies  Flowsheets (Taken 6/12/2024 1100)  Supportive Measures:   active listening utilized   self-reflection promoted   problem-solving facilitated   verbalization of feelings encouraged       Behavior: Pt was engaged, alert, and oriented during group.         Intervention: In this CBT-focused group LMSW facilitated a group discussion on self-control.  Each patient was asked to share areas in their life where they need to use more self-control, and how they could do s o in order to help meet their treatment goals.            Response: Pt spoke about  being an alcoholic. Pt identified underlying eating issues such as overeating. Pt  identified being a chain smoker. Pt identified trying not to focus on it find a routine that works for her and limit routines.           Plan: Continue to encourage pt to participate in process groups to verbalize feelings and develop healthy coping skills.

## 2024-06-12 NOTE — PLAN OF CARE
Problem: Adult Behavioral Health Plan of Care  Goal: Optimized Coping Skills in Response to Life Stressors  Intervention: Promote Effective Coping Strategies  Flowsheets (Taken 6/11/2024 1957)  Supportive Measures:   active listening utilized   goal-setting facilitated   verbalization of feelings encouraged   self-reflection promoted         Behavior: alert, oriented, engaged        Intervention: In this CBT-focused group CSW facilitated group discussion on coping strategies. Each patient was asked to identify unhealthy coping strategies and healthy coping strategies.          Response: Pt identified going to group and getting adequate sleep as healthy coping skills. Pt identified smoking cigarette as an unhealthy coping skill. Pt stated she plans to go back to [facility] meeting and get a sponsor to help keep her grounded.         Plan: Continue to encourage pt to participate in process groups to verbalize feelings and develop healthy coping skills.

## 2024-06-12 NOTE — PLAN OF CARE
Problem: Adult Behavioral Health Plan of Care  Goal: Patient-Specific Goal (Individualization)  Outcome: Progressing  Goal: Adheres to Safety Considerations for Self and Others  Outcome: Progressing  Goal: Absence of New-Onset Illness or Injury  Outcome: Progressing  Goal: Optimized Coping Skills in Response to Life Stressors  Outcome: Progressing  Goal: Develops/Participates in Therapeutic [place] to Support Successful Transition  Outcome: Progressing     Problem: Violence Risk or Actual  Goal: Anger and Impulse Control  Outcome: Progressing     Problem: Depressive Signs/Symptoms  Goal: Increased Participation and Engagement (Depressive Signs/Symptoms)  Outcome: Progressing  Goal: Enhanced Social, Occupational or Functional Skills (Depressive Signs/Symptoms)  Outcome: Progressing     Problem: Anxiety Signs/Symptoms  Goal: Optimized Cognitive Function (Anxiety Signs/Symptoms)  Outcome: Progressing  Goal: Improved Somatic Symptoms (Anxiety Signs/Symptoms)  Outcome: Progressing     Problem: Psychotic Signs/Symptoms  Goal: Improved Mood Symptoms (Psychotic Signs/Symptoms)  Outcome: Progressing     Problem: Suicidal Behavior  Goal: Suicidal Behavior is Absent or Managed  Outcome: Progressing     Problem: Fall Injury Risk  Goal: Absence of Fall and Fall-Related Injury  Outcome: Progressing

## 2024-06-13 VITALS
RESPIRATION RATE: 20 BRPM | HEART RATE: 80 BPM | DIASTOLIC BLOOD PRESSURE: 61 MMHG | SYSTOLIC BLOOD PRESSURE: 133 MMHG | OXYGEN SATURATION: 96 % | BODY MASS INDEX: 37.93 KG/M2 | HEIGHT: 72 IN | WEIGHT: 280 LBS | TEMPERATURE: 98 F

## 2024-06-13 PROCEDURE — 25000003 PHARM REV CODE 250: Performed by: PSYCHIATRY & NEUROLOGY

## 2024-06-13 PROCEDURE — 90833 PSYTX W PT W E/M 30 MIN: CPT | Mod: ,,, | Performed by: PSYCHIATRY & NEUROLOGY

## 2024-06-13 PROCEDURE — 99239 HOSP IP/OBS DSCHRG MGMT >30: CPT | Mod: ,,, | Performed by: PSYCHIATRY & NEUROLOGY

## 2024-06-13 RX ORDER — GABAPENTIN 300 MG/1
300 CAPSULE ORAL 3 TIMES DAILY
Qty: 90 CAPSULE | Refills: 1 | Status: SHIPPED | OUTPATIENT
Start: 2024-06-13 | End: 2025-06-13

## 2024-06-13 RX ORDER — ARIPIPRAZOLE 20 MG/1
20 TABLET ORAL DAILY
Qty: 30 TABLET | Refills: 1 | Status: SHIPPED | OUTPATIENT
Start: 2024-06-14 | End: 2025-06-14

## 2024-06-13 RX ADMIN — GABAPENTIN 300 MG: 300 CAPSULE ORAL at 08:06

## 2024-06-13 RX ADMIN — ARIPIPRAZOLE 20 MG: 10 TABLET ORAL at 08:06

## 2024-06-13 NOTE — PLAN OF CARE
Problem: Adult Behavioral Health Plan of Care  Goal: Patient-Specific Goal (Individualization)  Outcome: Progressing  Goal: Adheres to Safety Considerations for Self and Others  Outcome: Progressing  Goal: Absence of New-Onset Illness or Injury  Outcome: Progressing  Goal: Optimized Coping Skills in Response to Life Stressors  Outcome: Progressing  Goal: Develops/Participates in Therapeutic [place] to Support Successful Transition  Outcome: Progressing     Problem: Violence Risk or Actual  Goal: Anger and Impulse Control  Outcome: Progressing     Problem: Depressive Signs/Symptoms  Goal: Increased Participation and Engagement (Depressive Signs/Symptoms)  Outcome: Progressing  Goal: Enhanced Social, Occupational or Functional Skills (Depressive Signs/Symptoms)  Outcome: Progressing     Problem: Anxiety Signs/Symptoms  Goal: Optimized Cognitive Function (Anxiety Signs/Symptoms)  Outcome: Progressing  Goal: Improved Somatic Symptoms (Anxiety Signs/Symptoms)  Outcome: Progressing     Problem: Psychotic Signs/Symptoms  Goal: Improved Mood Symptoms (Psychotic Signs/Symptoms)  Outcome: Progressing     Problem: Suicidal Behavior  Goal: Suicidal Behavior is Absent or Managed  Outcome: Progressing     Problem: Excessive Substance Use  Goal: Improved Physiologic Symptoms (Excessive Substance Use)  Outcome: Progressing     Problem: Fall Injury Risk  Goal: Absence of Fall and Fall-Related Injury  Outcome: Progressing

## 2024-06-13 NOTE — DISCHARGE SUMMARY
"Discharge Summary  Psychiatry    Admit Date: 6/9/2024    Discharge Date and Time:  06/13/2024 9:17 AM    Attending Physician: Sebas Montes De Oca MD     Discharge Provider: Kirit Esquivel    Reason for Admission:  thoughts of death/suicide, jameson, psychosis     History of Present Illness:   HPI   The patient was seen and examined. The chart was reviewed.     The patient presented to the ER on 6/9/2024 .     The patient was medically cleared and admitted to the [facility].        Per ED MD:       Altered Mental Status         Per EMS, pt expressing manic behavior. Received 1.25mg Versed en route, she is calm and cooperative at this time.  reports patient has been taking Amoxicillin x8 days for strep throat and believes pt may be having a reaction to the abx.  reports similar episode x3 years ago.                      Jennifer Caceres is a 44 y.o. female patient with a PMHx of anxiety disorder who presents to the Emergency Department for evaluation of AMS which onset PTA. Pt believes she is in [place] and doesn't know why she is here. Pt states she has been awake in an "in and out" state for 5 days. Pt states she is having auditory hallucinations but has always had them. Pt denies any SI/HI. Per EMS,  reports pt has taken Amoxicillin 8 days for strep throat and believes this is a reaction of the medication because she had a similar episode 3 years ago. Pt is in calm and cooperative manner. Per EMS, prior Tx includes 1.25 mg Versed en route.     06/10/24: patient remains symptomatic at this time. Will continue inpatient psych admission for further evaluation and medication management.         Per RN:  43 yo F transferred from .. via \[facility]\"". Hx of bipolar 1 with psychotic features, manic severe with psychosis currently. Gravely disabled. Was admin Versed in route to ED prt EMS. SI, flight of ideas. Pt  told Pt arrived to unit escorted by [employer] and security. Proscan performed with neg results. Pt " "cooperative needs prompting and redirecting. Laughs inappropriately and gives different answers to same question, not reliable historian. Pt wondering into other pt rooms and redirected to her room.         Per ED psych consult MD:  The patient received 1.5 mg of Versed on route to the ER this morning. She has interviewed in the emergency room. She is acutely manic. She is experiencing some flight of ideas and repetition. Affect is labile with irritability to tearfulness. She is experiencing herself as pressing her own thoughts into other people's minds and hearing the thoughts of everybody else in the hospital positive auditory hallucinations as well. She is having suicidal thoughts and she feels like everyone wants me to die and join them. She states that she does not have any intention to try and harm herself while she has in the hospital. She is agreeable to getting some medication to try and help her feel better. She does not want me to talk to her  who is waiting in the waiting room. She can not really explain why. I could not really get a cogent history from her beyond this because of her mental status but this has happened previously proximally 3 years ago apparently.         Psychiatric interview:  "I have not been able to sleep for the last 5 days, I have too much to do, I was trying to get things done, I thought, I'll just have fun and gets stuff done, I was reorganizing, been having fun, my  said I can't do this again, so he called my best friend... I had a lot of plans." Reports 1 prior episode, in 2022, "I was in a behavioral hospital, I didn't want to fall asleep, because I thought I wouldn't wake up." Reports she has been smoking MJ nightly, "I love it, I smoked enough indica to pass out, I kept trying to do that... my heart rate was getting really fast, I had songs stuff in my head."    Procedures Performed: * No surgery found *    Hospital Course:    Patient was admitted to the " inpatient psychiatry unit after being medically cleared in the ED. Chart and labs were reviewed. The patient was stabilized as follows:      ASSESSMENT:   Bipolar disorder, type I, MRE manic, severe with PF  Unspecified anxiety disorder  Cannabis abuse  Obesity           PROBLEM LIST AND MANAGEMENT PLANS     Bipolar disorder, type I, MRE manic, severe with PF  - stop prozac  - start abilify 10 mg PO qd-Increase to 15 mg tomorrow-Continue, increase to 20 mg tomorrow  - pt counseled  - follow up with outpatient mental health providers after discharge for medication management and psychotherapy     Unspecified anxiety disorder  - start hydroxyzine 50 mg PO q 6 hours PRN  - start melatonin 15 mg PO qhs for sleep  - pt counseled  - follow up with outpatient mental health providers after discharge for medication management and psychotherapy     Cannabis abuse  - start gabapentin 100 mg PO TID-Increase to 300 mg TID  - pt counseled  - follow up with outpatient mental health providers after discharge for medication management and psychotherapy     Obesity  - avoid medications with high potential for serious weight gain (remeron, seroquel, etc.)        The patient reports improved symptoms as documented. The patient is requesting discharge.The patient is hopeful, future oriented and goal directed. The patient readily discusses both short and long term goals. The patient can identify positive coping skills and social support. AIMS score was 0. During hospitalization, the patient was encouraged to go to both groups and individual counseling. Patient was monitored for any side effects. A meeting was held with multidisciplinary team prior to discharge and pt's diagnosis, current medications, and follow up were discussed. The patient has been compliant with treatment and can adequately attend to activities of daily living in an independent manner. The patient denies any side effects. The patient denies SI, HI, plan or intent for  self harm or harm to others. The patient is no longer a danger to self or others nor gravely disabled disabled. Patient discharged  in stable condition with scheduled outpatient follow up.      Discussed diagnosis, risks and benefits of proposed treatment vs alternative treatments vs no treatment, and potential side effects of these treatments.  The patient expresses understanding of the above and displays the capacity to agree with this treatment given said understanding.  Patient also agrees that, currently, the benefits outweigh the risks and would like to pursue treatment at this time.      Discharge MSE: stated age, casually dressed, well groomed.  No psychomotor agitation or retardation.  No abnormal involuntary movements.  Gait normal.  Speech normal, conversational.  Language fluent English. Mood fine.  Affect normal range, pleasant, euthymic.  Thought process linear.  Associations intact.  Denies suicidal or homicidal ideation.  Denies auditory hallucinations, paranoid ideation, ideas of reference.  Memory intact.  Attention intact.  Fund of knowledge intact.  Insight intact.  Judgment intact.  Alert and oriented to person, place, time.      Tobacco Usage:  Is patient a smoker? No  Does patient want prescription for Tobacco Cessation? No  Does patient want counseling for Tobacco Cessation? No    If patient would like to quit, then over the counter nicotine patch could be used. The patient could also follow up with his PCP or psychiatric provider for other alternatives.     Final Diagnoses:    Principal Problem: Bipolar 1 MRE Manic   Secondary Diagnoses:   Cannabis abuse    Labs:  Admission on 06/09/2024, Discharged on 06/09/2024   Component Date Value Ref Range Status    WBC 06/09/2024 10.17  3.90 - 12.70 K/uL Final    RBC 06/09/2024 4.70  4.00 - 5.40 M/uL Final    Hemoglobin 06/09/2024 14.4  12.0 - 16.0 g/dL Final    Hematocrit 06/09/2024 42.4  37.0 - 48.5 % Final    MCV 06/09/2024 90  82 - 98 fL Final     MCH 06/09/2024 30.6  27.0 - 31.0 pg Final    MCHC 06/09/2024 34.0  32.0 - 36.0 g/dL Final    RDW 06/09/2024 11.9  11.5 - 14.5 % Final    Platelets 06/09/2024 339  150 - 450 K/uL Final    MPV 06/09/2024 10.1  9.2 - 12.9 fL Final    Immature Granulocytes 06/09/2024 0.6 (H)  0.0 - 0.5 % Final    Gran # (ANC) 06/09/2024 6.2  1.8 - 7.7 K/uL Final    Immature Grans (Abs) 06/09/2024 0.06 (H)  0.00 - 0.04 K/uL Final    Lymph # 06/09/2024 3.0  1.0 - 4.8 K/uL Final    Mono # 06/09/2024 0.8  0.3 - 1.0 K/uL Final    Eos # 06/09/2024 0.1  0.0 - 0.5 K/uL Final    Baso # 06/09/2024 0.06  0.00 - 0.20 K/uL Final    nRBC 06/09/2024 0  0 /100 WBC Final    Gran % 06/09/2024 60.4  38.0 - 73.0 % Final    Lymph % 06/09/2024 29.9  18.0 - 48.0 % Final    Mono % 06/09/2024 7.5  4.0 - 15.0 % Final    Eosinophil % 06/09/2024 1.0  0.0 - 8.0 % Final    Basophil % 06/09/2024 0.6  0.0 - 1.9 % Final    Differential Method 06/09/2024 Automated   Final    Sodium 06/09/2024 140  136 - 145 mmol/L Final    Potassium 06/09/2024 3.9  3.5 - 5.1 mmol/L Final    Chloride 06/09/2024 105  95 - 110 mmol/L Final    CO2 06/09/2024 22 (L)  23 - 29 mmol/L Final    Glucose 06/09/2024 106  70 - 110 mg/dL Final    BUN 06/09/2024 7  6 - 20 mg/dL Final    Creatinine 06/09/2024 0.8  0.5 - 1.4 mg/dL Final    Calcium 06/09/2024 9.8  8.7 - 10.5 mg/dL Final    Total Protein 06/09/2024 8.0  6.0 - 8.4 g/dL Final    Albumin 06/09/2024 4.4  3.5 - 5.2 g/dL Final    Total Bilirubin 06/09/2024 0.5  0.1 - 1.0 mg/dL Final    Alkaline Phosphatase 06/09/2024 95  55 - 135 U/L Final    AST 06/09/2024 30  10 - 40 U/L Final    ALT 06/09/2024 26  10 - 44 U/L Final    eGFR 06/09/2024 >60  >60 mL/min/1.73 m^2 Final    Anion Gap 06/09/2024 13  8 - 16 mmol/L Final    TSH 06/09/2024 1.558  0.400 - 4.000 uIU/mL Final    Specimen UA 06/09/2024 Urine, Catheterized   Final    Color, UA 06/09/2024 Colorless (A)  Yellow, Straw, Siobhan Final    Appearance, UA 06/09/2024 Clear  Clear Final    pH, UA  06/09/2024 8.0  5.0 - 8.0 Final    Specific Gravity, UA 06/09/2024 <1.005 (A)  1.005 - 1.030 Final    Protein, UA 06/09/2024 Negative  Negative Final    Glucose, UA 06/09/2024 Negative  Negative Final    Ketones, UA 06/09/2024 Negative  Negative Final    Bilirubin (UA) 06/09/2024 Negative  Negative Final    Occult Blood UA 06/09/2024 Negative  Negative Final    Nitrite, UA 06/09/2024 Negative  Negative Final    Urobilinogen, UA 06/09/2024 Negative  <2.0 EU/dL Final    Leukocytes, UA 06/09/2024 Negative  Negative Final    Benzodiazepines 06/09/2024 Negative  Negative Final    Methadone metabolites 06/09/2024 Negative  Negative Final    Cocaine (Metab.) 06/09/2024 Negative  Negative Final    Opiate Scrn, Ur 06/09/2024 Negative  Negative Final    Barbiturate Screen, Ur 06/09/2024 Negative  Negative Final    Amphetamine Screen, Ur 06/09/2024 Negative  Negative Final    THC 06/09/2024 Presumptive Positive (A)  Negative Final    Phencyclidine 06/09/2024 Negative  Negative Final    Creatinine, Urine 06/09/2024 13.6 (L)  15.0 - 325.0 mg/dL Final    Toxicology Information 06/09/2024 SEE COMMENT   Final    Alcohol, Serum 06/09/2024 <10  <10 mg/dL Final    Acetaminophen (Tylenol), Serum 06/09/2024 <3.0 (L)  10.0 - 20.0 ug/mL Final    SARS-CoV-2 RNA, Amplification, Qual 06/09/2024 Negative  Negative Final    Preg Test, Ur 06/09/2024 Negative   Final    HIV 1/2 Ag/Ab 06/09/2024 Negative  Negative Final    Hepatitis C Ab 06/09/2024 Negative  Negative Final    HEP C Virus Hold Specimen 06/09/2024 Hold for HCV sendout   Final         Discharged Condition: stable and improved; not currently a danger to self/others or gravely disabled    Disposition: Home or Self Care    Is patient being discharged on multiple neuroleptics? No    Follow Up/Patient Instructions:     Take all medications as prescribed.  Attend all psychiatric and medical follow up appointments.   Abstain from all drugs and alcohol.  Call the crisis line at:  [phone] for help in a crisis and emergent situations or call 911 and Return to ED for any acute worsening of your condition including suicidal or homicidal ideations      No discharge procedures on file.        Follow up apt: see sw note      Medications:  Reconciled Home Medications:      Medication List        START taking these medications      ARIPiprazole 20 MG Tab  Commonly known as: ABILIFY  Take 1 tablet (20 mg total) by mouth once daily.  Start taking on: June 14, 2024     gabapentin 300 MG capsule  Commonly known as: NEURONTIN  Take 1 capsule (300 mg total) by mouth 3 (three) times daily.            STOP taking these medications      FLUoxetine 40 MG capsule                Diet: regular     Activity as tolerated    Total time spent discharging patient: minutes    Kirit Esquivel NP  Psychiatry

## 2024-06-13 NOTE — NURSING
POC reviewed this shift and is ongoing.  Pt is cooperative on unit and complaint with medications. Pt interacts well with staff and peers.  Pt denies SI/HI/AVH and voices readiness for discharge tomorrow.

## 2024-06-13 NOTE — PROGRESS NOTES
Psychotherapy:  Target symptoms: mood swings, mood disorder  Why chosen therapy is appropriate versus another modality: relevant to diagnosis, evidence based practice  Outcome monitoring methods: self-report, observation  Therapeutic intervention type: insight oriented psychotherapy, supportive psychotherapy  Topics discussed/themes: building skills sets for symptom management, symptom recognition  The patient's response to the intervention is accepting. The patient's progress toward treatment goals is fair.   Duration of intervention: 16 minutes.